# Patient Record
Sex: MALE | Race: WHITE
[De-identification: names, ages, dates, MRNs, and addresses within clinical notes are randomized per-mention and may not be internally consistent; named-entity substitution may affect disease eponyms.]

---

## 2017-10-06 ENCOUNTER — HOSPITAL ENCOUNTER (EMERGENCY)
Dept: HOSPITAL 80 - FED | Age: 72
Discharge: HOME | End: 2017-10-06
Payer: SELF-PAY

## 2017-10-06 VITALS — TEMPERATURE: 97.5 F | OXYGEN SATURATION: 96 %

## 2017-10-06 VITALS — DIASTOLIC BLOOD PRESSURE: 80 MMHG | HEART RATE: 72 BPM | SYSTOLIC BLOOD PRESSURE: 150 MMHG | RESPIRATION RATE: 15 BRPM

## 2017-10-06 DIAGNOSIS — Z76.0: Primary | ICD-10-CM

## 2017-10-06 DIAGNOSIS — N40.0: ICD-10-CM

## 2017-10-06 NOTE — EDPHY
H & P


Time Seen by Provider: 10/06/17 16:57


HPI/ROS: 





CHIEF COMPLAINT:  Medication refill request





HISTORY OF PRESENT ILLNESS:  72-year-old male history of BPH, on daily Flomax, 

recently moved from [place], requesting refill of his Flomax.  Currently 

asymptomatic, Dr. out of Flomax.  No dysuria no hematuria headache no fever no 

chills no perineal pain 











************


PHYSICAL EXAM





(Prior to examination, patient consented to physical exam, hands were washed 

and my usual and customary physical exam procedures followed)


1) GENERAL: Well-developed, well-nourished, alert and oriented.  Appears to be 

in no acute distress.


2) HEAD: Normocephalic


3) HEENT: sclera anicteric   


4) LUNGS: Breathing comfortably.   





Constitutional: 


 Initial Vital Signs











Temperature (C)  36.4 C   10/06/17 15:35


 


Heart Rate  86   10/06/17 15:35


 


Respiratory Rate  20   10/06/17 15:35


 


Blood Pressure  152/88 H  10/06/17 15:35


 


O2 Sat (%)  96   10/06/17 15:35








 











O2 Delivery Mode               Room Air














Allergies/Adverse Reactions: 


 





No Known Allergies Allergy (Unverified 10/06/17 15:35)


 








Home Medications: 














 Medication  Instructions  Recorded


 


Flomax 0.4 MG (*)  10/06/17


 


Tamsulosin HCl [Flomax 0.4 MG (*)] 0.4 mg PO DAILY #30 cap 10/06/17














MDM/Departure





- MDM


ED Course/Re-evaluation: 





Patient given refill of his Flomax for 1 month.  Recommend he establish care 

with primary care, given [facility] and also given him on-call urology 

follow-up information. Care of patient under supervision of  secondary 

supervising physician Dr Ma . 





- Depart


Disposition: Home, Routine, Self-Care


Clinical Impression: 


 Medication refill





BPH (benign prostatic hyperplasia)


Qualifiers:


 Lower urinary tract symptom presence: unspecified whether lower urinary tract 

symptoms present Qualified Code(s): N40.0 - Benign prostatic hyperplasia 

without lower urinary tract symptoms





Condition: Good


Instructions:  Benign Prostatic Hypertrophy (ED)


Prescriptions: 


Tamsulosin HCl [Flomax 0.4 MG (*)] 0.4 mg PO DAILY #30 cap


Referrals: 


Stan Santiago MD [Medical Doctor] - 5-7 days, call for appt. (Dr Santiago is a 

urologist)


[facility],. [Clinic] - 2-3 days, call for appt.

## 2018-10-18 ENCOUNTER — HOSPITAL ENCOUNTER (OUTPATIENT)
Dept: HOSPITAL 80 - FIMAGING | Age: 73
End: 2018-10-18
Attending: UROLOGY
Payer: COMMERCIAL

## 2018-10-18 DIAGNOSIS — N32.3: ICD-10-CM

## 2018-10-18 DIAGNOSIS — K76.89: ICD-10-CM

## 2018-10-18 DIAGNOSIS — K59.00: ICD-10-CM

## 2018-10-18 DIAGNOSIS — N42.89: Primary | ICD-10-CM

## 2018-10-18 PROCEDURE — 74178 CT ABD&PLV WO CNTR FLWD CNTR: CPT

## 2018-11-25 ENCOUNTER — HOSPITAL ENCOUNTER (INPATIENT)
Dept: HOSPITAL 80 - FED | Age: 73
LOS: 4 days | Discharge: NURSING, REHAB OR LONG-TERM CARE | DRG: 689 | End: 2018-11-29
Attending: INTERNAL MEDICINE | Admitting: INTERNAL MEDICINE
Payer: COMMERCIAL

## 2018-11-25 DIAGNOSIS — G93.41: ICD-10-CM

## 2018-11-25 DIAGNOSIS — N39.0: Primary | ICD-10-CM

## 2018-11-25 DIAGNOSIS — G20: ICD-10-CM

## 2018-11-25 DIAGNOSIS — Z16.29: ICD-10-CM

## 2018-11-25 DIAGNOSIS — E87.0: ICD-10-CM

## 2018-11-25 DIAGNOSIS — B96.20: ICD-10-CM

## 2018-11-25 DIAGNOSIS — E86.0: ICD-10-CM

## 2018-11-25 DIAGNOSIS — N40.1: ICD-10-CM

## 2018-11-25 LAB
CK SERPL-CCNC: 703 IU/L (ref 0–224)
PLATELET # BLD: 194 10^3/UL (ref 150–400)

## 2018-11-25 PROCEDURE — G0103 PSA SCREENING: HCPCS

## 2018-11-25 PROCEDURE — G0378 HOSPITAL OBSERVATION PER HR: HCPCS

## 2018-11-25 RX ADMIN — DOCUSATE SODIUM AND SENNOSIDES SCH: 50; 8.6 TABLET ORAL at 22:03

## 2018-11-25 NOTE — GHP
DATE OF ADMISSION:  11/25/2018



CHIEF COMPLAINT:  Altered mental status.



HISTORY:  The patient is a 73-year-old male, who has had decreased energy progressive over the last w
[place], getting acutely worse since last night.  After dinner last night, his son noticed him to be very
 confused.  He suddenly could not walk.  He was diagnosed with a urinary tract infection 2 days ago. 
 It is unclear if he got put on antibiotics.  He does have urinary frequency and according to his son
 gets up 15 times per night to urinate.  There has been no fever.  The patient is unable to provide a
ny additional history due to severe altered mental status on presentation.



PAST MEDICAL HISTORY:  

1.  Parkinson's.

2.  BPH, status post TURP.



MEDICATIONS:  Please see computerized record for full detailed list.



ALLERGIES:  No known drug allergies.



SOCIAL HISTORY:  No smoking.  No alcohol.  He lives with his son and a caretaker in an apartment.



REVIEW OF SYSTEMS:  Complete review of systems obtained.  Review of systems negative for constitution
al, HEENT, GI, pulmonary, cardiovascular, , hematologic, musculoskeletal, endocrine, psych, except 
for positive pertinent negatives as under HPI.  Although please note, this is noted by the patient's 
inability to participate.



FAMILY HISTORY:  Reviewed and noncontributory to presenting complaint.



PHYSICAL EXAMINATION:  GENERAL:  Well-developed, well-nourished male, in no distress.  VITAL SIGNS:  
Temperature is 36.8, pulse 81, blood pressure 118/74, satting 96% on room air.  EYE:  Normal conjunct
ivae.  Pupils equal and reactive to light.  ENT:  Normal ears and nose.  Hearing intact.  Normal teet
h.  Oropharynx is dry.  NECK:  Trachea midline.  No thyromegaly.  CHEST:  Normal respiratory effort. 
 LUNGS:  Clear to auscultation bilaterally.  CARDIOVASCULAR: Regular rate and rhythm.  No murmur.  No
 lower extremity edema.  ABDOMEN:  Soft, nontender.  No hepatosplenomegaly.  SKIN:  Warm, dry, intact
 with no rash.  MUSCULOSKELETAL:  No cyanosis or clubbing.  Strength 5/5 upper and lower extremities.
  Although diffusely weak, is equal bilaterally.  NEURO:  Cranial nerves grossly intact.  Normal sens
ation to light touch as best as I can examine at this time.  PSYCH:  He is confused.  He is altered. 
 He does follow simple commands but rapidly closes his eyes again.  He is not really participating ve
rbally, just moaning.



LABORATORY DATA:  White count 6.4, hematocrit 41, platelets 194.  Sodium of 155, potassium 4.8, chlor
jay 110, bicarb 22, BUN 28, creatinine 0.7, glucose 97.  LFTs are normal.  Chest x-ray is negative.  
Head CT is negative except for scalp nodule.  Urinalysis shows 50 to 182 white blood cells.  His revi
ew of old chart shows recent urine culture growing E coli resistant to Levaquin.  Also recent CT scan
 of the abdomen and pelvis that shows constipation as well as asymmetric prostate enlargement.



ASSESSMENT/PLAN:  

1.  Metabolic encephalopathy due to electrolyte disturbance and urinary tract infection.  We will dar
p him n.p.o. until he is more awake as I do not think he is safe to swallow at this time. 

2.  Hypernatremia.  He got a liter of normal saline in the emergency room.  We will check a stat repe
at sodium now and then follow serially.  We will initiate half-normal saline.

3.  Urinary tract infection.  Recent culture grew E coli resistant to Levaquin.  We will continue IV 
ceftriaxone.  He was given first dose in the emergency room. 

4.  Asymmetric prostate by imaging.  We will check a PSA.

5.  BPH.  Continue Flomax.

6.  Parkinson's.  Consult Physical Therapy, Occupational Therapy.



CODE STATUS:  Full.



ADMISSION STATUS:  We will admit to observation.  We will re-evaluate tomorrow regarding ongoing need
 for hospitalization.



DVT PROPHYLAXIS:  He is high risk.  We will place him on subcu Lovenox.





Job #:  [id number]

## 2018-11-25 NOTE — EDPHY
H & P


Time Seen by Provider: 11/25/18 16:38


HPI/ROS: 





CHIEF COMPLAINT:  Altered mental status





HISTORY OF PRESENT ILLNESS:  The patient is brought in by paramedics with 

altered mental status.  He reportedly has been sick in in bed over the past 

week.  The patient is unable to provide much history.  Paramedics report the 

patient is typically ambulatory and conversant.  They report a history of 

Parkinson's disease and possible prior stroke.  The patient is unable to 

provide any HPI secondary to his altered mental status.





In reviewing the patient's past medical records it appears he had a urinary 

tract infection diagnosed 2 days ago.





The patient's son presents to the emergency department and reports that he did 

see his physician 2 days ago but is uncertain if antibiotics were started.  The 

patient has had symptoms of a mild illness with fatigue, malaise and purulent 

rhinorrhea which started today.  The patient has been able unable to walk.  The 

patient reportedly takes Seroquel at night for possible Parkinson's disease 

according to the patient's son.





REVIEW OF SYSTEMS:


A comprehensive 10 point review of systems is unobtainable secondary to altered 

mental status








Exam Limitations: Clinical condition





- Medical/Surgical History


Hx Asthma: No


Hx Chronic Respiratory Disease: No


Hx Diabetes: No


Hx Cardiac Disease: No


Hx Renal Disease: No


Hx Cirrhosis: No


Hx Alcoholism: No


Hx HIV/AIDS: No


Hx Splenectomy or Spleen Trauma: No


Other PMH: prostate- TURP





- Social History


Smoking Status: Never smoked


Alcohol Use: None





- Physical Exam


Exam: 











General Appearance:  Alert, no distress


Eyes:  Pupils equal and round no pallor or injection


ENT, Mouth:  Dry mucous membranes


Respiratory:  There are no retractions, lungs are clear to auscultation


Cardiovascular:  Regular rate and rhythm


Gastrointestinal:  Generalized abdominal tenderness


Neurological:  Withdrawals to pain all 4 extremities, unable to assess cranial 

nerves, nonverbal


Skin:  Warm and dry, no rashes


Musculoskeletal:  Neck is supple nontender


Extremities:  symmetrical, full range of motion











Constitutional: 


 Initial Vital Signs











Temperature (C)  36.8 C   11/25/18 16:45


 


Heart Rate  88   11/25/18 16:45


 


Respiratory Rate  16   11/25/18 16:45


 


Blood Pressure  151/92 H  11/25/18 16:45


 


O2 Sat (%)  96   11/25/18 16:45








 











O2 Delivery Mode               Room Air


 


O2 (L/minute)                  2














Allergies/Adverse Reactions: 


 





No Known Allergies Allergy (Verified 11/25/18 16:48)


 








Home Medications: 














 Medication  Instructions  Recorded


 


Tamsulosin HCl [Flomax 0.4 MG (*)] 0.4 mg PO DAILY #30 cap 10/06/17


 


Primidone [Mysoline] 25 mg PO HS 11/25/18














Medical Decision Making





- Diagnostics


Imaging Results: 


 Imaging Impressions





Chest X-Ray  11/25/18 16:38


Impression: Perihilar bronchitis with left perihilar diskoid subsegmental 

atelectasis versus scar, with no convincing focal alveolar consolidation.


 


Should there be progression of the patient's symptoms, consider PA and lateral 

upright views in the department.








Head CT  11/25/18 17:07


Impression: 


 


1. There is no acute intracranial abnormality identified on this unenhanced CT 

evaluation.


2. There is a 2.7 x 1.4 x 2.7 cm oval-shaped left occipital scalp solid nodule, 

which could represent a complex sebaceous cyst or a nevus. Clinical correlation 

is suggested.


3. Chronic multicentric paranasal sinusitis, most pronounced at the level of 

the right maxillary sinus.


 


If there is further clinical concern regarding the patient's symptoms, MR 

imaging is suggested, if not otherwise contraindicated.


 


Findings were discussed with Zak Holland MD at 17:55, on 11/25/2018.


 











ED Course/Re-evaluation: 





The patient presents to the ED with altered mental status, marked clinical 

dehydration and a urinary tract infection.





The patient had an IV established.  His initial sodium was noted to be 155.  

The patient received 1 L of normal saline.





I reviewed the patient's past medical records including his urine culture from 

2 days ago which demonstrated E coli.





Blood cultures x2 are obtained.





The patient's initial the lactic acid is normal at 1.5.





The patient was started on 1 g of IV ceftriaxone.





The patient was taken for noncontrast head CT scan which does demonstrate 

sinusitis without evidence of an intracranial hemorrhage.





Discussion:  The patient presents to the ED with increasing weakness in the 

setting of a urinary tract infection and likely upper respiratory infection/

sinusitis.  Patient has been started on IV ceftriaxone in the emergency 

department.  Consultation was made with Dr. Rodríguez from the hospitalist service 

who will admit the patient this evening.























Differential Diagnosis: 





Differential diagnosis considered includes sepsis, severe sepsis, septic shock, 

dehydration, metabolic derangement, urinary tract infection, bacteremia





- Data Points


Laboratory Results: 


 Laboratory Results





 11/25/18 16:37 





 11/25/18 16:37 





 











  11/25/18 11/25/18 11/25/18





  17:00 16:50 16:50


 


WBC      





    


 


RBC      





    


 


Hgb      





    


 


POC Hgb    13.9 gm/dL gm/dL  





    (13.7-17.5)  


 


Hct      





    


 


POC Hct    41 % %  





    (40-51)  


 


MCV      





    


 


MCH      





    


 


MCHC      





    


 


RDW      





    


 


Plt Count      





    


 


MPV      





    


 


Neut % (Auto)      





    


 


Lymph % (Auto)      





    


 


Mono % (Auto)      





    


 


Eos % (Auto)      





    


 


Baso % (Auto)      





    


 


Nucleat RBC Rel Count      





    


 


Absolute Neuts (auto)      





    


 


Absolute Lymphs (auto)      





    


 


Absolute Monos (auto)      





    


 


Absolute Eos (auto)      





    


 


Absolute Basos (auto)      





    


 


Absolute Nucleated RBC      





    


 


Immature Gran %      





    


 


Immature Gran #      





    


 


VBG Lactic Acid      1.5 mmol/L mmol/L





     (0.7-2.1) 


 


POC Sodium    143 mEq/L mEq/L  





    (135-145)  


 


Sodium      





    


 


POC Potassium    4.1 mEq/L mEq/L  





    (3.3-5.0)  


 


Potassium      





    


 


POC Chloride    110 mEq/L mEq/L  





    ()  


 


Chloride      





    


 


Carbon Dioxide      





    


 


Anion Gap      





    


 


POC BUN    27 mg/dL H mg/dL  





    (7-23)  


 


BUN      





    


 


Creatinine      





    


 


POC Creatinine    0.7 mg/dL mg/dL  





    (0.7-1.3)  


 


Estimated GFR      





    


 


Glucose      





    


 


POC Glucose    98 mg/dL mg/dL  





    ()  


 


Calcium      





    


 


Total Bilirubin      





    


 


Conjugated Bilirubin      





    


 


Unconjugated Bilirubin      





    


 


AST      





    


 


ALT      





    


 


Alkaline Phosphatase      





    


 


Creatine Kinase      





    


 


CK-MB (CK-2) Fraction      





    


 


CK-MB (CK-2) %      





    


 


Creatine Kinase Interp      





    


 


Total Protein      





    


 


Albumin      





    


 


Urine Color  YELLOW     





    


 


Urine Appearance  HAZY     





    


 


Urine pH  5.0     





   (5.0-7.5)   


 


Ur Specific Gravity  1.017     





   (1.002-1.030)   


 


Urine Protein  NEGATIVE     





   (NEGATIVE)   


 


Urine Ketones  TRACE  H     





   (NEGATIVE)   


 


Urine Blood  1+  H     





   (NEGATIVE)   


 


Urine Nitrate  POSITIVE  H     





   (NEGATIVE)   


 


Urine Bilirubin  NEGATIVE     





   (NEGATIVE)   


 


Urine Urobilinogen  2.0 EU H EU    





   (0.2-1.0)   


 


Ur Leukocyte Esterase  3+  H     





   (NEGATIVE)   


 


Urine RBC  3-5 /hpf H /hpf    





   (0-3)   


 


Urine WBC   /hpf H /hpf    





   (0-3)   


 


Ur Epithelial Cells  NONE SEEN /lpf /lpf    





   (NONE-1+)   


 


Urine Bacteria  1+ /hpf H /hpf    





   (NONE SEEN)   


 


Urine Glucose  NEGATIVE     





   (NEGATIVE)   














  11/25/18 11/25/18 11/25/18





  16:37 16:37 16:37


 


WBC      6.40 10^3/uL 10^3/uL





     (3.80-9.50) 


 


RBC      4.60 10^6/uL 10^6/uL





     (4.40-6.38) 


 


Hgb      14.3 g/dL g/dL





     (13.7-17.5) 


 


POC Hgb      





    


 


Hct      41.1 % %





     (40.0-51.0) 


 


POC Hct      





    


 


MCV      89.3 fL fL





     (81.5-99.8) 


 


MCH      31.1 pg pg





     (27.9-34.1) 


 


MCHC      34.8 g/dL g/dL





     (32.4-36.7) 


 


RDW      14.9 % %





     (11.5-15.2) 


 


Plt Count      194 10^3/uL 10^3/uL





     (150-400) 


 


MPV      9.3 fL fL





     (8.7-11.7) 


 


Neut % (Auto)      63.7 % %





     (39.3-74.2) 


 


Lymph % (Auto)      20.0 % %





     (15.0-45.0) 


 


Mono % (Auto)      12.5 % %





     (4.5-13.0) 


 


Eos % (Auto)      3.0 % %





     (0.6-7.6) 


 


Baso % (Auto)      0.5 % %





     (0.3-1.7) 


 


Nucleat RBC Rel Count      0.0 % %





     (0.0-0.2) 


 


Absolute Neuts (auto)      4.08 10^3/uL 10^3/uL





     (1.70-6.50) 


 


Absolute Lymphs (auto)      1.28 10^3/uL 10^3/uL





     (1.00-3.00) 


 


Absolute Monos (auto)      0.80 10^3/uL 10^3/uL





     (0.30-0.80) 


 


Absolute Eos (auto)      0.19 10^3/uL 10^3/uL





     (0.03-0.40) 


 


Absolute Basos (auto)      0.03 10^3/uL 10^3/uL





     (0.02-0.10) 


 


Absolute Nucleated RBC      0.00 10^3/uL 10^3/uL





     (0-0.01) 


 


Immature Gran %      0.3 % %





     (0.0-1.1) 


 


Immature Gran #      0.02 10^3/uL 10^3/uL





     (0.00-0.10) 


 


VBG Lactic Acid      





    


 


POC Sodium      





    


 


Sodium    155 mEq/L H mEq/L  





    (135-145)  


 


POC Potassium      





    


 


Potassium    4.8 mEq/L mEq/L  





    (3.3-5.0)  


 


POC Chloride      





    


 


Chloride    110 mEq/L mEq/L  





    ()  


 


Carbon Dioxide    22 mEq/l mEq/l  





    (22-31)  


 


Anion Gap    23 mEq/L H mEq/L  





    (6-14)  


 


POC BUN      





    


 


BUN    28 mg/dL H mg/dL  





    (7-23)  


 


Creatinine    0.7 mg/dL mg/dL  





    (0.7-1.3)  


 


POC Creatinine      





    


 


Estimated GFR    > 60   





    


 


Glucose    97 mg/dL mg/dL  





    ()  


 


POC Glucose      





    


 


Calcium    9.2 mg/dL mg/dL  





    (8.5-10.4)  


 


Total Bilirubin    1.3 mg/dL mg/dL  





    (0.1-1.4)  


 


Conjugated Bilirubin    0.2 mg/dL mg/dL  





    (0.0-0.5)  


 


Unconjugated Bilirubin    1.1 mg/dL mg/dL  





    (0.0-1.1)  


 


AST    45 IU/L IU/L  





    (17-59)  


 


ALT    42 IU/L IU/L  





    (21-72)  


 


Alkaline Phosphatase    71 IU/L IU/L  





    ()  


 


Creatine Kinase  703 IU/L H IU/L    





   (0-224)   


 


CK-MB (CK-2) Fraction  7.16 ng/mL H ng/mL    





   (0.00-4.55)   


 


CK-MB (CK-2) %  1.0 % %    





   (0.0-4.0)   


 


Creatine Kinase Interp  NEGATIVE     





   (NEGATIVE)   


 


Total Protein    7.0 g/dL g/dL  





    (6.3-8.2)  


 


Albumin    3.6 g/dL g/dL  





    (3.5-5.0)  


 


Urine Color      





    


 


Urine Appearance      





    


 


Urine pH      





    


 


Ur Specific Gravity      





    


 


Urine Protein      





    


 


Urine Ketones      





    


 


Urine Blood      





    


 


Urine Nitrate      





    


 


Urine Bilirubin      





    


 


Urine Urobilinogen      





    


 


Ur Leukocyte Esterase      





    


 


Urine RBC      





    


 


Urine WBC      





    


 


Ur Epithelial Cells      





    


 


Urine Bacteria      





    


 


Urine Glucose      





    











Medications Given: 


 








Discontinued Medications





Sodium Chloride (Ns)  1,000 mls @ 0 mls/hr IV EDNOW ONE; Wide Open


   PRN Reason: Protocol


   Stop: 11/25/18 17:08


   Last Admin: 11/25/18 17:15 Dose:  1,000 mls


Ceftriaxone Sodium/Dextrose (Rocephin 1 Gm (Premix))  50 mls @ 100 mls/hr IV 

EDNOW ONE


   PRN Reason: Protocol


   Stop: 11/25/18 17:44


   Last Admin: 11/25/18 17:50 Dose:  50 mls





Point of Care Test Results: 


 Chemistry











  11/25/18





  16:50


 


POC Sodium  143 mEq/L mEq/L





   (135-145) 


 


POC Potassium  4.1 mEq/L mEq/L





   (3.3-5.0) 


 


POC Chloride  110 mEq/L mEq/L





   () 


 


POC BUN  27 mg/dL H mg/dL





   (7-23) 


 


POC Creatinine  0.7 mg/dL mg/dL





   (0.7-1.3) 


 


POC Glucose  98 mg/dL mg/dL





   () 








 ISTAT H&H











  11/25/18





  16:50


 


POC Hgb  13.9 gm/dL gm/dL





   (13.7-17.5) 


 


POC Hct  41 % %





   (40-51) 














Departure





- Departure


Disposition: [facility] Inpatient Acute


Clinical Impression: 


 Dehydration, Urinary tract infection, Sinusitis





Condition: Fair

## 2018-11-26 RX ADMIN — DOCUSATE SODIUM AND SENNOSIDES SCH: 50; 8.6 TABLET ORAL at 23:12

## 2018-11-26 RX ADMIN — ENOXAPARIN SODIUM SCH MG: 100 INJECTION SUBCUTANEOUS at 08:31

## 2018-11-26 RX ADMIN — DOCUSATE SODIUM AND SENNOSIDES SCH TAB: 50; 8.6 TABLET ORAL at 08:30

## 2018-11-26 RX ADMIN — DOCUSATE SODIUM AND SENNOSIDES SCH: 50; 8.6 TABLET ORAL at 08:00

## 2018-11-26 RX ADMIN — PRIMIDONE SCH MG: 50 TABLET ORAL at 20:46

## 2018-11-26 NOTE — HOSPPROG
Hospitalist Progress Note


Assessment/Plan: 





74 yo M w parkinson's admitted w encephalopathy, uti, hypernatremia





hypernatremia: likely 2/2 poor po 


   no diarrhea


   was 137 on 11/25 at noon


   155 last cash at 4:30 p


   corrected to 137 by 1 A


   


   1. stop IVF


   2. stat chem 7


   3. renal to see





uti: continue ceftriaxone


   dc fuentes





parkinson's: continue meds





encephalopathy: suspect it is from uti and mild hypernatremia 2.2 poor po from 

encephalopathy


   follow


   can be slow to clear in parkinson's





proph: lmwh





dispo: change to inpt


Subjective: case d/w dr weaver.  alert but confused


Objective: 


 Vital Signs











Temp Pulse Resp BP Pulse Ox


 


 36.8 C   73   16   119/75   95 


 


 11/26/18 07:18  11/26/18 07:18  11/26/18 07:18  11/26/18 07:18  11/26/18 07:18








 Laboratory Results





 11/26/18 07:20 





 











 11/25/18 11/26/18 11/27/18





 05:59 05:59 05:59


 


Intake Total  1325 


 


Output Total  700 


 


Balance  625 














- Physical Exam


Constitutional: no apparent distress, appears nourished


Eyes: PERRL, anicteric sclera


Ears, Nose, Mouth, Throat: moist mucous membranes, hearing normal


Cardiovascular: regular rate and rhythym, no murmur, rub, or gallop


Respiratory: no respiratory distress, no rales or rhonchi


Gastrointestinal: normoactive bowel sounds, soft, non-tender abdomen


Genitourinary: no bladder fullness, fuentes in urethra


Skin: warm, normal color


Musculoskeletal: full muscle strength, no muscle tenderness


Neurologic: AAOx3





ICD10 Worksheet


Patient Problems: 


 Problems











Problem Status Onset


 


Dehydration Acute  


 


Sinusitis Acute  


 


Urinary tract infection Acute

## 2018-11-26 NOTE — PDMN
Medical Necessity


Medical necessity: MCG M300 UTI:  74 yo w/ UTI dx as outpt, s/sx progressively 

worsened w/ decreased energy, confusion and inability to ambulate.  Metabolic 

encephalopathy due to hypernatremia and UTI. Pt initially OBS but continues 

with confusion, Pt remains NPO as not safe to swallow due to AMS.  IV antibx to 

cont.  PT/OT/SLP consults ordered.  Renal consult pending. BC pending. Pt meets 

IP criteria for UTI w/ persistent AMS and ongoing need for parenteral antibx,.  

Hx Parkinson's, BPH s/p TURP.  Change to IP status 11/26/18 @1234 per MD order

## 2018-11-26 NOTE — ASMTCMCOM
CM Note

 

CM Note                       

Notes:

Patient plan of care reviewed in rounds. 73 year old male admitted via ED for AMS. He has history 

of Parkinsons and presents with UTI and dehydration. He per report also has a caregiver and normal 

is fairly mobile per his son's report. CM to follow for needs.

Plan: TBD

 

Date Signed:  11/26/2018 01:03 PM

Electronically Signed By:Nargis Dillon RN

## 2018-11-26 NOTE — PDCONSULT
Consultant Note: 





Assessment/Plan:





Hypernatremia: Na was 155 on presentation yesterday after being 138 two days 

prior, and then repeat labs have shown it consistently at 137.  On one hand, it 

is possible the 155 is an error as it is the only level out of range.  On the 

other hand, he may have had some significant dehydration in setting of UTI and 

multiple urinations and 155 was real, but this was a very acute change for him 

as it happened in less than two days, so quick correction is less worrisome.


 - Will place on a low rate of LR while NPO.


 - Will recheck tomorrow to ensure stability, no need for further rechecks 

today.








Thank you for the interesting consult.  Nephrology will continue to follow, 

please call if you have any additional questions or concerns.





H & P


Stated Complaint: ems via family - pt getting sicker over 1 wk, non verbal 

since last pm


Time Seen by Provider: 11/25/18 16:38


HPI/ROS: 





HPI:


Mr. Alvarez is a 72 yo M who presented to ER with confusion yesterday.  Per 

son, he had been diagnosed with a UTI two days prior to presentation.  He had 

been noted at home to be getting increasingly lethargic for about a week.  But 

in the day prior to presentation, son noted he seemed much more confused, 

prompting him to bring him to ER.  Pt had sodium of 38 two days prior to 

presentation in ER.  Pt had sodium up to 155 in ER yesterday, was given 1L NS 

and sodium was immediately down to 143 and then has been 137 since then on 

multiple checks.  The large change in sodium prompted consult.





ROS:


unable to obtain 2/2 clinical condition


Source: RN/MD





- Medical/Surgical History


Hx Asthma: No


Hx Chronic Respiratory Disease: No


Hx Diabetes: No


Hx Cardiac Disease: No


Hx Renal Disease: No


Hx Cirrhosis: No


Hx Alcoholism: No


Hx HIV/AIDS: No


Hx Splenectomy or Spleen Trauma: No


Other PMH: prostate- TURP





- Family History


Significant Family History: No pertinent family hx





- Social History


Smoking Status: Never smoked





- Physical Exam


Exam: 





General: awake, no acute distress


Eyes: EOMI, PERRL


OP: Clear, dry MM


Neck: supple, no thyromegaly


Cv: RRR, no edema BLE


Resp: CTA bilat, nonlabored respirations


Abd: Soft, Nt/ND


Neuro: CN II-XII grossly intact, no asterixis


Skin: C/D/I, no rash


: fuentes catheter in place


Constitutional: 


 Initial Vital Signs











Temperature (C)  36.8 C   11/25/18 16:45


 


Heart Rate  88   11/25/18 16:45


 


Respiratory Rate  16   11/25/18 16:45


 


Blood Pressure  151/92 H  11/25/18 16:45


 


O2 Sat (%)  96   11/25/18 16:45








 











O2 Delivery Mode               Room Air


 


O2 (L/minute)                  2














Allergies/Adverse Reactions: 


 





No Known Allergies Allergy (Verified 11/25/18 16:48)


 








Home Medications: 














 Medication  Instructions  Recorded


 


Primidone [Mysoline] 25 mg PO HS 11/25/18


 


QUEtiapine FUMARATE [Seroquel 25 25 mg PO HS 11/25/18





mg (*)]  


 


Herbals/Supplements -Info Only 1 ea PO DAILY 11/26/18


 


Madopar 50mg 1 each PO TID 11/26/18














Lab and Imaging





 11/25/18 16:37





 11/26/18 12:20














WBC  6.40 10^3/uL (3.80-9.50)   11/25/18  16:37    


 


RBC  4.60 10^6/uL (4.40-6.38)   11/25/18  16:37    


 


Hgb  14.3 g/dL (13.7-17.5)   11/25/18  16:37    


 


POC Hgb  13.9 gm/dL (13.7-17.5)   11/25/18  16:50    


 


Hct  41.1 % (40.0-51.0)   11/25/18  16:37    


 


POC Hct  41 % (40-51)   11/25/18  16:50    


 


MCV  89.3 fL (81.5-99.8)   11/25/18  16:37    


 


MCH  31.1 pg (27.9-34.1)   11/25/18  16:37    


 


MCHC  34.8 g/dL (32.4-36.7)   11/25/18  16:37    


 


RDW  14.9 % (11.5-15.2)   11/25/18  16:37    


 


Plt Count  194 10^3/uL (150-400)   11/25/18  16:37    


 


MPV  9.3 fL (8.7-11.7)   11/25/18  16:37    


 


Neut % (Auto)  63.7 % (39.3-74.2)   11/25/18  16:37    


 


Lymph % (Auto)  20.0 % (15.0-45.0)   11/25/18  16:37    


 


Mono % (Auto)  12.5 % (4.5-13.0)   11/25/18  16:37    


 


Eos % (Auto)  3.0 % (0.6-7.6)   11/25/18  16:37    


 


Baso % (Auto)  0.5 % (0.3-1.7)   11/25/18  16:37    


 


Nucleat RBC Rel Count  0.0 % (0.0-0.2)   11/25/18  16:37    


 


Absolute Neuts (auto)  4.08 10^3/uL (1.70-6.50)   11/25/18  16:37    


 


Absolute Lymphs (auto)  1.28 10^3/uL (1.00-3.00)   11/25/18  16:37    


 


Absolute Monos (auto)  0.80 10^3/uL (0.30-0.80)   11/25/18  16:37    


 


Absolute Eos (auto)  0.19 10^3/uL (0.03-0.40)   11/25/18  16:37    


 


Absolute Basos (auto)  0.03 10^3/uL (0.02-0.10)   11/25/18  16:37    


 


Absolute Nucleated RBC  0.00 10^3/uL (0-0.01)   11/25/18  16:37    


 


Immature Gran %  0.3 % (0.0-1.1)   11/25/18  16:37    


 


Immature Gran #  0.02 10^3/uL (0.00-0.10)   11/25/18  16:37    


 


VBG Lactic Acid  1.5 mmol/L (0.7-2.1)   11/25/18  16:50    


 


POC Sodium  143 mEq/L (135-145)   11/25/18  16:50    


 


Sodium  137 mEq/L (135-145)   11/26/18  12:20    


 


POC Potassium  4.1 mEq/L (3.3-5.0)   11/25/18  16:50    


 


Potassium  3.8 mEq/L (3.3-5.0)   11/26/18  12:20    


 


POC Chloride  110 mEq/L ()   11/25/18  16:50    


 


Chloride  111 mEq/L ()  H  11/26/18  12:20    


 


Carbon Dioxide  21 mEq/l (22-31)  L  11/26/18  12:20    


 


Anion Gap  5 mEq/L (6-14)  L  11/26/18  12:20    


 


POC BUN  27 mg/dL (7-23)  H  11/25/18  16:50    


 


BUN  21 mg/dL (7-23)   11/26/18  12:20    


 


Creatinine  0.6 mg/dL (0.7-1.3)  L  11/26/18  12:20    


 


POC Creatinine  0.7 mg/dL (0.7-1.3)   11/25/18  16:50    


 


Estimated GFR  > 60   11/26/18  12:20    


 


Glucose  102 mg/dL ()  H  11/26/18  12:20    


 


POC Glucose  98 mg/dL ()   11/25/18  16:50    


 


Calcium  8.5 mg/dL (8.5-10.4)   11/26/18  12:20    


 


Total Bilirubin  1.3 mg/dL (0.1-1.4)   11/25/18  16:37    


 


Conjugated Bilirubin  0.2 mg/dL (0.0-0.5)   11/25/18  16:37    


 


Unconjugated Bilirubin  1.1 mg/dL (0.0-1.1)   11/25/18  16:37    


 


AST  45 IU/L (17-59)   11/25/18  16:37    


 


ALT  42 IU/L (21-72)   11/25/18  16:37    


 


Alkaline Phosphatase  71 IU/L ()   11/25/18  16:37    


 


Creatine Kinase  703 IU/L (0-224)  H  11/25/18  16:37    


 


CK-MB (CK-2) Fraction  7.16 ng/mL (0.00-4.55)  H  11/25/18  16:37    


 


CK-MB (CK-2) %  1.0 % (0.0-4.0)   11/25/18  16:37    


 


Creatine Kinase Interp  NEGATIVE  (NEGATIVE)   11/25/18  16:37    


 


Total Protein  7.0 g/dL (6.3-8.2)   11/25/18  16:37    


 


Albumin  3.6 g/dL (3.5-5.0)   11/25/18  16:37    


 


PSA Screen  1.800 ng/mL (0.000-4.000)   11/26/18  01:15    


 


TSH  1.730 uIU/mL (0.465-4.680)   11/26/18  01:15    


 


Urine Color  YELLOW   11/25/18  17:00    


 


Urine Appearance  HAZY   11/25/18  17:00    


 


Urine pH  5.0  (5.0-7.5)   11/25/18  17:00    


 


Ur Specific Gravity  1.017  (1.002-1.030)   11/25/18  17:00    


 


Urine Protein  NEGATIVE  (NEGATIVE)   11/25/18  17:00    


 


Urine Ketones  TRACE  (NEGATIVE)  H  11/25/18  17:00    


 


Urine Blood  1+  (NEGATIVE)  H  11/25/18  17:00    


 


Urine Nitrate  POSITIVE  (NEGATIVE)  H  11/25/18  17:00    


 


Urine Bilirubin  NEGATIVE  (NEGATIVE)   11/25/18  17:00    


 


Urine Urobilinogen  2.0 EU (0.2-1.0)  H  11/25/18  17:00    


 


Ur Leukocyte Esterase  3+  (NEGATIVE)  H  11/25/18  17:00    


 


Urine RBC  3-5 /hpf (0-3)  H  11/25/18  17:00    


 


Urine WBC   /hpf (0-3)  H  11/25/18  17:00    


 


Ur Epithelial Cells  NONE SEEN /lpf (NONE-1+)   11/25/18  17:00    


 


Urine Bacteria  1+ /hpf (NONE SEEN)  H  11/25/18  17:00    


 


Urine Glucose  NEGATIVE  (NEGATIVE)   11/25/18  17:00

## 2018-11-27 RX ADMIN — PRIMIDONE SCH MG: 50 TABLET ORAL at 20:31

## 2018-11-27 RX ADMIN — ENOXAPARIN SODIUM SCH MG: 100 INJECTION SUBCUTANEOUS at 08:56

## 2018-11-27 RX ADMIN — DOCUSATE SODIUM AND SENNOSIDES SCH: 50; 8.6 TABLET ORAL at 20:56

## 2018-11-27 RX ADMIN — DOCUSATE SODIUM AND SENNOSIDES SCH: 50; 8.6 TABLET ORAL at 14:30

## 2018-11-27 NOTE — HOSPPROG
Hospitalist Progress Note


Assessment/Plan: 





*  Hypernatremia


   -rapid correction - Na 155 may have been lab error as no other Na levels 

even close to that range


   -labs done 2 days prior to admission show normal sodium - therefore if real, 

definitely acute


   -given acute nature of hypernatremia - rapid correction less worrisome


*  Metabolic encephalopathy - due to UTI


   -slow improvement mental status


*  UTI - question possible prostatitis give asymmetry of prostate on imaging, 

recurrent Ecoli


   -levaquin resistant


   -continue IV ceftriaxone


*  BPH s/p TURP


   -PSA normal


   -DC fuentes with voiding trials


*  Parkinson's


   -needs home med











 


Subjective: no new complaints, yesterday very bad day confusion wise, today 

much better


Objective: 


 Vital Signs











Temp Pulse Resp BP Pulse Ox


 


 36.4 C   65   16   83/49 L  95 


 


 11/27/18 17:36  11/27/18 17:36  11/27/18 17:36  11/27/18 17:36  11/27/18 17:36








 Laboratory Results





 11/27/18 04:40 





 











 11/26/18 11/27/18 11/28/18





 05:59 05:59 05:59


 


Intake Total  900 


 


Output Total  1150 


 


Balance  -250 














- Physical Exam


Constitutional: no apparent distress, appears nourished, not in pain


Cardiovascular: regular rate and rhythym, no murmur, rub, or gallop


Respiratory: no respiratory distress, no rales or rhonchi, clear to auscultation


Gastrointestinal: normoactive bowel sounds, soft, non-tender abdomen, no 

palpable masses


Skin: no rashes or abrasions, no fluctuance, no induration


Neurologic: AAOx3, sensation intact bilaterally


Psychiatric: interacting appropriately, not anxious, not encephalopathic, 

thought process linear





ICD10 Worksheet


Patient Problems: 


 Problems











Problem Status Onset


 


Dehydration Acute  


 


Urinary tract infection Acute  


 


Sinusitis Acute

## 2018-11-28 RX ADMIN — ENOXAPARIN SODIUM SCH MG: 100 INJECTION SUBCUTANEOUS at 08:54

## 2018-11-28 RX ADMIN — DOCUSATE SODIUM AND SENNOSIDES SCH TAB: 50; 8.6 TABLET ORAL at 21:55

## 2018-11-28 RX ADMIN — PRIMIDONE SCH MG: 50 TABLET ORAL at 21:54

## 2018-11-28 RX ADMIN — DOCUSATE SODIUM AND SENNOSIDES SCH: 50; 8.6 TABLET ORAL at 08:54

## 2018-11-28 NOTE — ASMTCMCOM
CM Note

 

CM Note                       

Notes:

Discussed plan of care with MD. Patient may need SNF instead of HHC. Referral to [facility]. CM to 

follow.



Plan: TBD

 

Date Signed:  11/28/2018 09:17 AM

Electronically Signed By:Nargis Dillon RN

## 2018-11-28 NOTE — ASMTCMCOM
CM Note

 

CM Note                       

Notes:

Patient discussed in rounds. CM met with pt and pts elenitaewJorje to provide education on therapy 

recommendations. Pt and family agreeable to SNF at [facility]. CM informed [facility] of D/C plans 

for tomorrow. CM to follow. 



Plan: [facility]

 

Date Signed:  11/28/2018 12:44 PM

Electronically Signed By:LAURA Watts

## 2018-11-28 NOTE — HOSPPROG
Hospitalist Progress Note


Assessment/Plan: 





*  Hypernatremia


   -rapid correction - Na 155 may have been lab error as no other Na levels 

even close to that range


   -labs done 2 days prior to admission show normal sodium - therefore if real, 

definitely acute


   -given acute nature of hypernatremia - rapid correction less worrisome


*  Metabolic encephalopathy - due to UTI


   -slow improvement mental status


*  UTI - question possible prostatitis give asymmetry of prostate on imaging, 

recurrent Ecoli


   -levaquin resistant E.coli (urine culture done outpatient prior to admission)


   -change to PO Bactrim


      -will have better prostrate penetration, but not sure he needs full 6 

weeks for prostatis


*  BPH s/p TURP


   -PSA normal


*  Parkinson's


   -needs home med











 


Subjective: No complaints.   Still very weak, unsteady, not safe for home


Objective: 


 Vital Signs











Temp Pulse Resp BP Pulse Ox


 


 36.6 C   73   16   100/54 L  96 


 


 11/28/18 11:29  11/28/18 11:29  11/28/18 11:29  11/28/18 11:29  11/28/18 11:29








 Laboratory Results





 11/28/18 04:52 





 











 11/27/18 11/28/18 11/29/18





 05:59 05:59 05:59


 


Intake Total 900 300 250


 


Output Total 1150 575 500


 


Balance -250 -275 -250














- Physical Exam


Constitutional: no apparent distress, appears nourished, not in pain


Cardiovascular: regular rate and rhythym, no murmur, rub, or gallop


Respiratory: no respiratory distress, no rales or rhonchi, clear to auscultation


Gastrointestinal: normoactive bowel sounds, soft, non-tender abdomen, no 

palpable masses


Skin: no rashes or abrasions, no fluctuance, no induration


Neurologic: AAOx3, sensation intact bilaterally


Psychiatric: interacting appropriately, not anxious, not encephalopathic, 

thought process linear





ICD10 Worksheet


Patient Problems: 


 Problems











Problem Status Onset


 


Dehydration Acute  


 


Sinusitis Acute  


 


Urinary tract infection Acute

## 2018-11-29 VITALS — DIASTOLIC BLOOD PRESSURE: 52 MMHG | SYSTOLIC BLOOD PRESSURE: 87 MMHG

## 2018-11-29 RX ADMIN — ENOXAPARIN SODIUM SCH MG: 100 INJECTION SUBCUTANEOUS at 10:15

## 2018-11-29 RX ADMIN — DOCUSATE SODIUM AND SENNOSIDES SCH TAB: 50; 8.6 TABLET ORAL at 10:16

## 2018-11-29 NOTE — ASMTDCNOTE
Case Management Discharge

 

Discharge Order Complete?     Answers:  Yes                                   

Patient to Obtain             Answers:  Other                         Notes:  [facility]

Medications                                                                   

Transportation Arranged       Answers:  Other                         Notes:  [employer]

Transport will Pick (Date     11/29/2018 04:00 PM

& Time)                       

Faxed Final Orders            Answers:  Yes                                   

Agency/Facility Transfer      Answers:  Yes                                   

Report Printed & Faxed to                                                     

Receiving Agency                                                              

Family Notified               Answers:  Yes                           Notes:  by pt's RN

Discharge Comments            

Notes:

Pt to discharge today to [facility]. Updated referrals sent. RN given number for RN to RN report at 


[facility]. Nephew requested to ride with [facility] to [facility] and [facility] arranged for 

this to be ok. Facesheet given to [facility].  No further CM needs noted at this time. 

 

Date Signed:  11/29/2018 03:50 PM

Electronically Signed By:Alfreda Tyler

## 2018-11-29 NOTE — PDIAF
- Diagnosis


Code Status: Full Code





- Medication Management


Long Term Antibiotics: Bactrim DS 1 tab BID


Long Term Antibiotic Stop Date: 12/09/18


Discharge Medications: electronically signed and located in the Home Medication 

List.





- Orders


Services needed: Certified Nursing Aide, Physical Therapy, Occupational Therapy


Diet Texture: Dysphagia 1 - Pureed, Thin Liquids, Meds Whole w/Liquids





- Follow Up Care


Current Providers and Referrals: 


Patient,NotPresent [Unknown] - As per Instructions

## 2018-11-29 NOTE — PDDCSUM
Discharge Summary


Discharge Summary: 


Date of Admission: 11/26/2018


 Date of Discharge: 11/29/2018





Consults: Nephrology 


Procedures: Head CT


Followup: PCP





Hospital Course Problem List: 


*  Hypernatremia


   -rapid correction - Na 155 on admisison may have been lab error as no other 

Na levels even close to that range


   -labs done 2 days prior to admission show normal sodium - therefore if real, 

definitely acute


   -given acute nature of hypernatremia - rapid correction less worrisome, 

improved to 130 without intervention, continue to monitor 


*  Metabolic encephalopathy - due to UTI, Head CT negative on admission 


   -back to baseline 


*  UTI - question possible prostatitis give asymmetry of prostate on imaging, 

recurrent Ecoli


   -levaquin resistant E.coli (urine culture done outpatient prior to admission)


   -change to PO Bactrim for total 2 week course 


*  BPH s/p TURP


   -PSA normal


*  Parkinson's


   -Continue home medications





Time spent on discharge was >35 minutes with >50% of time spent on patient 

education and counseling

## 2018-11-29 NOTE — ASDISCHSUM
----------------------------------------------

Discharge Information

----------------------------------------------

Plan Status:SNF                                      Medically Cleared to Leave:11/28/2018

Discharge Date:11/28/2018                            CM D/C Disposition:Skilled Nursing Facility

ADT D/C Disposition:                                 Projected Discharge Date:11/28/2018 11:00 AM

Transportation at D/C:[employer]                 Discharge Delay Reason:

Follow-Up Date:11/28/2018 11:00 AM                   Discharge Slot:

Final Diagnosis:UTI and prostatitis

----------------------------------------------

Placement Information

----------------------------------------------

Referral Type:*Home Health Care Services             Referral ID:[id number]

Provider Name:

Address 1:                                           Phone Number:

Address 2:                                           Fax Number:

City:                                                [facility] Factors:

State:

 

Referral Type:*Nursing Home/SNF                      Referral ID:[id number]

Provider Name:[facility]

Address 1:[address] [place] [address]                             Phone [phone]

Address 2:                                           Fax [phone]

City:[place]                                         Selection Factors:

State:[place]

 

----------------------------------------------

Patient Contact Information

----------------------------------------------

Contact Name:SÁNCHEZ                         Relationship:Son

Address:                                             Home [phone]

                                                     Work Phone:

City:[facility]                                 Alternate Phone:

State/Zip Code:                                      Email:

----------------------------------------------

Financial Information

----------------------------------------------

Financial Class:Medicare

Primary Plan Desc:MEDICARE INPATIENT                 Primary Plan Number:[id number]

[facility]     Secondary Plan Number:[id number]

 

 

----------------------------------------------

Assessment Information

----------------------------------------------

----------------------------------------------

LACE

----------------------------------------------

LACE

 

Length of stay for            Answers:  3 days                                

current admission                                                             

Acuity / Level of             Answers:  Yes                                   

Care: Did the patient                                                         

have an inpatient                                                             

admission?                                                                    

Comorbidities - select        Answers:  Other                         Notes:  Parkinson's disease

all that apply                                                                

# of Emergency department     Answers:  1-2                                   

visits in the last 6                                                          

months                                                                        

Score: 8

 

Date Signed:  11/29/2018 03:47 PM

Electronically Signed By:Alfreda Tyler

 

 

----------------------------------------------

[facility] CM Progress Note

----------------------------------------------

CM Note

 

CM Note                       

Notes:

Patient plan of care reviewed in rounds. 73 year old male admitted via ED for AMS. He has history 

of Parkinsons and presents with UTI and dehydration. He per report also has a caregiver and normal 

is fairly mobile per his son's report. CM to follow for needs.

Plan: TBD

 

Date Signed:  11/26/2018 01:03 PM

Electronically Signed By:Nargis Dillon RN

 

 

----------------------------------------------

[facility] Progress Note

----------------------------------------------

CM Note

 

CM Note                       

Notes:

Discussed plan of care with MD. Patient may need SNF instead of C. Referral to [facility]. CM to 

follow.



Plan: TBD

 

Date Signed:  11/28/2018 09:17 AM

Electronically Signed By:Nargis Dillon RN

 

 

----------------------------------------------

[facility] AN Progress Note

----------------------------------------------

CM Note

 

CM Note                       

Notes:

Patient discussed in rounds. CM met with pt and pts Jorje hernandez to provide education on therapy 

recommendations. Pt and family agreeable to SNF at [facility]. CM informed [facility] of D/C plans 

for tomorrow. CM to follow. 



Plan: [facility]

 

Date Signed:  11/28/2018 12:44 PM

Electronically Signed By:LAURA Watts

 

 

----------------------------------------------

Case Management Discharge Plan Note

----------------------------------------------

Case Management Discharge

 

Discharge Order Complete?     Answers:  Yes                                   

Patient to Obtain             Answers:  Other                         Notes:  [facility]

Medications                                                                   

Transportation Arranged       Answers:  Other                         Notes:  [employer]

Transport will Pick (Date     11/29/2018 04:00 PM

& Time)                       

Faxed Final Orders            Answers:  Yes                                   

Agency/Facility Transfer      Answers:  Yes                                   

Report Printed & Faxed to                                                     

Receiving Agency                                                              

Family Notified               Answers:  Yes                           Notes:  by pt's RN

Discharge Comments            

Notes:

Pt to discharge today to [facility]. Updated referrals sent. RN given number for RN to RN report at 


[facility]. Nephew requested to ride with [employer] to facility and [facility] arranged for 

this to be ok. Facesheet given to [facility].  No further CM needs noted at this time. 

 

Date Signed:  11/29/2018 03:50 PM

Electronically Signed By:Alfreda Tyler

 

 

----------------------------------------------

Intervention Information

----------------------------------------------

Intervention Type:*POWERS-Signed                       Date of Service:11/26/2018 10:58 AM

Patient Type:Observation                             Staff Member:Magi Leija

Hours:                                               Discipline:

Severity:                                            Comment:

Intervention Type:*IM-Signed                         Date of Service:11/29/2018 02:22 PM

Patient Type:Inpatient                               Staff Member:Magi Leija

Hours:                                               Discipline:

Severity:                                            Comment:

## 2018-12-25 ENCOUNTER — HOSPITAL ENCOUNTER (INPATIENT)
Dept: HOSPITAL 80 - FED | Age: 73
LOS: 4 days | Discharge: NURSING, REHAB OR LONG-TERM CARE | DRG: 71 | End: 2018-12-29
Attending: INTERNAL MEDICINE | Admitting: INTERNAL MEDICINE
Payer: COMMERCIAL

## 2018-12-25 DIAGNOSIS — F02.80: ICD-10-CM

## 2018-12-25 DIAGNOSIS — G31.83: ICD-10-CM

## 2018-12-25 DIAGNOSIS — K59.00: ICD-10-CM

## 2018-12-25 DIAGNOSIS — G93.41: Primary | ICD-10-CM

## 2018-12-25 DIAGNOSIS — G25.0: ICD-10-CM

## 2018-12-25 DIAGNOSIS — E72.20: ICD-10-CM

## 2018-12-25 DIAGNOSIS — Z87.440: ICD-10-CM

## 2018-12-25 DIAGNOSIS — R62.7: ICD-10-CM

## 2018-12-25 DIAGNOSIS — R26.89: ICD-10-CM

## 2018-12-25 LAB — PLATELET # BLD: 191 10^3/UL (ref 150–400)

## 2018-12-25 PROCEDURE — G0378 HOSPITAL OBSERVATION PER HR: HCPCS

## 2018-12-25 PROCEDURE — A9585 GADOBUTROL INJECTION: HCPCS

## 2018-12-25 PROCEDURE — G0480 DRUG TEST DEF 1-7 CLASSES: HCPCS

## 2018-12-25 RX ADMIN — PRIMIDONE SCH MG: 50 TABLET ORAL at 20:23

## 2018-12-25 NOTE — ASMTCMCOM
CM Note

 

CM Note                       

Notes:

See ED report for details regarding presentation to ED via EMS.  Patient is [place] and speaks 

minimal English.



Patient's son Jorje [phone] contacted per this CM upon patient's arrival to ED.  Jorje states 

that patient was discharged home from [facility] ([facility]) within the past week and has been having 

increasing difficulty with AMS and ambulating at home.  per Jorje, patient was stable on his feet 

upon returning home and is now "unsteady" and more impulsive with his activity.  Patient has been 

"road tested" in the ED and unable to ambulate safely without assistance due to unstable gait.



This CM has contacted Shanifrah at [facility] *** [phone]*** CM contact ONLY.  Carlotta 

confirms that this is a change in status and patient will likely meet criteria (within 30 day 

window) for readmission to SNF.  Carlotta also confirms that  had made the recommendation to 

family to seek an AL/Memory Care placement for patient due to patient's increasing 

dementia.  Carlotta is unable to have someone out for assessment until morning.



I have discussed above with Jorje.  He agrees with the recommendation for a higher level of care and 


has begun looking into options at [facility] and [facility].  He will continue this process.  



Patient will be admitted for observation with "potential" and Carlotta will follow up with return 

admission to  tomorrow.  This CM has initiated the referral via [employer].  



CM to follow



 

 

Date Signed:  12/25/2018 05:24 PM

Electronically Signed By:Nida Duran RN

## 2018-12-25 NOTE — PDGENHP
History and Physical





- Chief Complaint


AMS 





- History of Present Illness


Clovis Alvarez is a 72 yo M with a PMHx of Dementia, recent UTI, BPH who 

presents to [facility] for AMS.  Patient was recently hospitalized for UTI and 

dehydration for which he completed 2 weeks of antibiotics.  He currently denies 

any dysuria, urinary frequency, bladder tenderness.  He denies any chest pain, f

/c, n/v, d/c, SOB, LH, dizziness.  Per ED report, patient has had difficulty 

walking over past several days with normal gait previously.  








History Information





- Allergies/Home Medication List


Allergies/Adverse Reactions: 








No Known Allergies Allergy (Verified 12/25/18 13:30)


 





Home Medications: 








Aspirin EC [Aspirin EC 81 mg (*)] 81 mg PO DAILY 12/25/18 [Last Taken 12/24/18]


Primidone [Mysoline 50mg (RX)] 25 mg PO HS 12/25/18 [Last Taken 12/24/18]





I have personally reviewed and updated: family history, medical history, social 

history, surgical history





- Social History


Smoking Status: Never smoked





Review of Systems


Review of Systems: 








Physical Exam


Physical Exam: 

















Temp Pulse Resp BP Pulse Ox


 


 37.1 C   70   17   125/72 H  96 


 


 12/25/18 18:29  12/25/18 18:29  12/25/18 18:29  12/25/18 18:29  12/25/18 18:29














Lab Data & Imaging Review





 12/25/18 13:45





 12/25/18 13:45














WBC  3.73 10^3/uL (3.80-9.50)  L  12/25/18  13:45    


 


RBC  4.27 10^6/uL (4.40-6.38)  L  12/25/18  13:45    


 


Hgb  13.7 g/dL (13.7-17.5)   12/25/18  13:45    


 


Hct  40.4 % (40.0-51.0)   12/25/18  13:45    


 


MCV  94.6 fL (81.5-99.8)   12/25/18  13:45    


 


MCH  32.1 pg (27.9-34.1)   12/25/18  13:45    


 


MCHC  33.9 g/dL (32.4-36.7)   12/25/18  13:45    


 


RDW  14.7 % (11.5-15.2)   12/25/18  13:45    


 


Plt Count  191 10^3/uL (150-400)   12/25/18  13:45    


 


MPV  9.5 fL (8.7-11.7)   12/25/18  13:45    


 


Neut % (Auto)  38.6 % (39.3-74.2)  L  12/25/18  13:45    


 


Lymph % (Auto)  41.0 % (15.0-45.0)   12/25/18  13:45    


 


Mono % (Auto)  12.3 % (4.5-13.0)   12/25/18  13:45    


 


Eos % (Auto)  6.7 % (0.6-7.6)   12/25/18  13:45    


 


Baso % (Auto)  1.1 % (0.3-1.7)   12/25/18  13:45    


 


Nucleat RBC Rel Count  0.0 % (0.0-0.2)   12/25/18  13:45    


 


Absolute Neuts (auto)  1.44 10^3/uL (1.70-6.50)  L  12/25/18  13:45    


 


Absolute Lymphs (auto)  1.53 10^3/uL (1.00-3.00)   12/25/18  13:45    


 


Absolute Monos (auto)  0.46 10^3/uL (0.30-0.80)   12/25/18  13:45    


 


Absolute Eos (auto)  0.25 10^3/uL (0.03-0.40)   12/25/18  13:45    


 


Absolute Basos (auto)  0.04 10^3/uL (0.02-0.10)   12/25/18  13:45    


 


Absolute Nucleated RBC  0.00 10^3/uL (0-0.01)   12/25/18  13:45    


 


Immature Gran %  0.3 % (0.0-1.1)   12/25/18  13:45    


 


Immature Gran #  0.01 10^3/uL (0.00-0.10)   12/25/18  13:45    


 


Sodium  137 mEq/L (135-145)   12/25/18  13:45    


 


Potassium  3.9 mEq/L (3.5-5.2)   12/25/18  13:45    


 


Chloride  109 mEq/L ()   12/25/18  13:45    


 


Carbon Dioxide  24 mEq/l (22-31)   12/25/18  13:45    


 


Anion Gap  4 mEq/L (6-14)  L  12/25/18  13:45    


 


BUN  25 mg/dL (7-23)  H  12/25/18  13:45    


 


Creatinine  0.8 mg/dL (0.7-1.3)   12/25/18  13:45    


 


Estimated GFR  > 60   12/25/18  13:45    


 


Glucose  96 mg/dL ()   12/25/18  13:45    


 


Calcium  9.0 mg/dL (8.5-10.4)   12/25/18  13:45    


 


Urine Color  YELLOW   12/25/18  14:53    


 


Urine Appearance  CLEAR   12/25/18  14:53    


 


Urine pH  6.0  (5.0-7.5)   12/25/18  14:53    


 


Ur Specific Gravity  1.013  (1.002-1.030)   12/25/18  14:53    


 


Urine Protein  NEGATIVE  (NEGATIVE)   12/25/18  14:53    


 


Urine Ketones  NEGATIVE  (NEGATIVE)   12/25/18  14:53    


 


Urine Blood  NEGATIVE  (NEGATIVE)   12/25/18  14:53    


 


Urine Nitrate  NEGATIVE  (NEGATIVE)   12/25/18  14:53    


 


Urine Bilirubin  NEGATIVE  (NEGATIVE)   12/25/18  14:53    


 


Urine Urobilinogen  NEGATIVE EU (0.2-1.0)   12/25/18  14:53    


 


Ur Leukocyte Esterase  NEGATIVE  (NEGATIVE)   12/25/18  14:53    


 


Urine Glucose  NEGATIVE  (NEGATIVE)   12/25/18  14:53    











Assessment & Plan


Assessment: 


Altered Mental Status


- Confused per family, hx of dementia


- AAOx2 on exam, interacting appropriately


- UA negative, recent UTI with AMS, denies urinary symptoms


- May be related to medications, home medications include Primidone, Seroquel, 

Madopar


- Head CT on admission was negative for acute abnormality


- Continue to monitor 





Failure to Thrive/Gait instability (Acute)


- 4 days duration per family


- PT/OT ordered


- CM working on placement 





Parkinson's Disease 


- Continue home medications pending med pec





FEN: Regular, IVF 





DVT PPx: Lovenox





Code: FULL





Dispo: Admit to Observation

## 2018-12-25 NOTE — EDPHY
H & P


Time Seen by Provider: 12/25/18 13:26


HPI/ROS: 





CHIEF COMPLAINT:  Altered mental status





HISTORY OF PRESENT ILLNESS:  The patient presents to the ED with altered mental 

status.  The patient was hospitalized recently with a urinary tract infection 

and dehydration.  He completed antibiotics for 2 weeks.  Patient was noted to 

have an E coli infection at that point time which was resistant to Levaquin.  

It appears the patient did have a follow-up urinalysis performed following 

treatment which demonstrated clearance of the E coli infection.  The patient 

himself does have a history of dementia.  He speaks only Swazi.  History was 

obtained from the electronic .  The patient has no acute complaints.

  He denies history of fall or trauma.





After some time the patient's family presented to the emergency department 

report that he has primarily had difficulty walking over the past several days.

  He had been ambulatory with a normal gait 4 days ago.





REVIEW OF SYSTEMS:


A comprehensive 10 point review of systems is otherwise negative aside from 

elements mentioned in the history of present illness.








Source: Patient


Exam Limitations: No limitations





- Medical/Surgical History


Hx Asthma: No


Hx Chronic Respiratory Disease: No


Hx Diabetes: No


Hx Cardiac Disease: No


Hx Renal Disease: No


Hx Cirrhosis: No


Hx Alcoholism: No


Hx HIV/AIDS: No


Hx Splenectomy or Spleen Trauma: No


Other PMH: prostate- TURP/Parkinsons





- Social History


Smoking Status: Never smoked





- Physical Exam


Exam: 





General Appearance:  Elderly male


Eyes:  Pupils equal and round no pallor or injection


ENT, Mouth:  Dry mucous membranes


Respiratory:  There are no retractions, lungs are clear to auscultation


Cardiovascular:  Regular rate and rhythm


Gastrointestinal:  Abdomen is soft and nontender, no masses, bowel sounds normal


Neurological:  A&O, normal motor function, normal sensory exam, normal cranial 

nerves, patient is ambulatory however is noted to have an ataxic gait


Skin:  Warm and dry, no rashes


Musculoskeletal:  Neck is supple nontender


Extremities:  symmetrical, full range of motion


Psychiatric:  Patient is oriented X 1 (baseline), there is no agitation


Constitutional: 


 Initial Vital Signs











Temperature (C)  36.9 C   12/25/18 13:30


 


Heart Rate  76   12/25/18 13:30


 


Respiratory Rate  16   12/25/18 13:30


 


Blood Pressure  127/79 H  12/25/18 13:30


 


O2 Sat (%)  97   12/25/18 13:30








 











O2 Delivery Mode               Room Air














Allergies/Adverse Reactions: 


 





No Known Allergies Allergy (Verified 12/25/18 13:30)


 








Home Medications: 














 Medication  Instructions  Recorded


 


Primidone [Mysoline] 25 mg PO HS 11/25/18


 


QUEtiapine FUMARATE [Seroquel 25 25 mg PO HS 11/25/18





mg (*)]  


 


Herbals/Supplements -Info Only 1 ea PO DAILY 11/26/18


 


Madopar 50mg 1 each PO TID 11/26/18


 


Acetaminophen [Tylenol 325mg (*)] 650 mg PO Q4 PRN  tab 11/29/18


 


Sulfamethox/Tmp 800/160 mg 1 ea PO BID #20 tab 11/29/18





[Bactrim DS]  














Medical Decision Making


ED Course/Re-evaluation: 





I reviewed the patient's past medical records including the results of his 

hospitalization month ago.  I reviewed his outpatient urine culture from 2 

weeks ago.





The patient is nontoxic and well-appearing.  He appears at his neurologic 

baseline with evidence of likely dementia.  He has no external signs of trauma.

  He is afebrile.





Given the patient's gait imbalance a CT scan of the brain was obtained which 

demonstrates no acute abnormalities.





Patient's laboratory studies are within normal limits.





The patient's urinalysis demonstrates no evidence of an infection.





At this point time I see no evidence of a explanation for acute weakness and 

difficulty walking aside from mild dehydration and possible progression of his 

underlying dementia.





The patient was evaluated by case management.  Secondary to his acute gait 

abnormality he will be admitted to the hospital for attempted placement in a 

skilled nursing facility.





Consultation was made with Dr. Chase at 5:30 p.m.











Differential Diagnosis: 





Differential diagnosis considered includes dementia, dehydration, metabolic 

abnormality, urinary tract infection





- Data Points


Laboratory Results: 


 Laboratory Results





 12/25/18 13:45 





 12/25/18 13:45 





 











  12/25/18 12/25/18 12/25/18





  14:53 13:45 13:45


 


WBC      3.73 10^3/uL L 10^3/uL





     (3.80-9.50) 


 


RBC      4.27 10^6/uL L 10^6/uL





     (4.40-6.38) 


 


Hgb      13.7 g/dL g/dL





     (13.7-17.5) 


 


Hct      40.4 % %





     (40.0-51.0) 


 


MCV      94.6 fL fL





     (81.5-99.8) 


 


MCH      32.1 pg pg





     (27.9-34.1) 


 


MCHC      33.9 g/dL g/dL





     (32.4-36.7) 


 


RDW      14.7 % %





     (11.5-15.2) 


 


Plt Count      191 10^3/uL 10^3/uL





     (150-400) 


 


MPV      9.5 fL fL





     (8.7-11.7) 


 


Neut % (Auto)      38.6 % L %





     (39.3-74.2) 


 


Lymph % (Auto)      41.0 % %





     (15.0-45.0) 


 


Mono % (Auto)      12.3 % %





     (4.5-13.0) 


 


Eos % (Auto)      6.7 % %





     (0.6-7.6) 


 


Baso % (Auto)      1.1 % %





     (0.3-1.7) 


 


Nucleat RBC Rel Count      0.0 % %





     (0.0-0.2) 


 


Absolute Neuts (auto)      1.44 10^3/uL L 10^3/uL





     (1.70-6.50) 


 


Absolute Lymphs (auto)      1.53 10^3/uL 10^3/uL





     (1.00-3.00) 


 


Absolute Monos (auto)      0.46 10^3/uL 10^3/uL





     (0.30-0.80) 


 


Absolute Eos (auto)      0.25 10^3/uL 10^3/uL





     (0.03-0.40) 


 


Absolute Basos (auto)      0.04 10^3/uL 10^3/uL





     (0.02-0.10) 


 


Absolute Nucleated RBC      0.00 10^3/uL 10^3/uL





     (0-0.01) 


 


Immature Gran %      0.3 % %





     (0.0-1.1) 


 


Immature Gran #      0.01 10^3/uL 10^3/uL





     (0.00-0.10) 


 


Sodium    137 mEq/L mEq/L  





    (135-145)  


 


Potassium    3.9 mEq/L mEq/L  





    (3.5-5.2)  


 


Chloride    109 mEq/L mEq/L  





    ()  


 


Carbon Dioxide    24 mEq/l mEq/l  





    (22-31)  


 


Anion Gap    4 mEq/L L mEq/L  





    (6-14)  


 


BUN    25 mg/dL H mg/dL  





    (7-23)  


 


Creatinine    0.8 mg/dL mg/dL  





    (0.7-1.3)  


 


Estimated GFR    > 60   





    


 


Glucose    96 mg/dL mg/dL  





    ()  


 


Calcium    9.0 mg/dL mg/dL  





    (8.5-10.4)  


 


Urine Color  YELLOW     





    


 


Urine Appearance  CLEAR     





    


 


Urine pH  6.0     





   (5.0-7.5)   


 


Ur Specific Gravity  1.013     





   (1.002-1.030)   


 


Urine Protein  NEGATIVE     





   (NEGATIVE)   


 


Urine Ketones  NEGATIVE     





   (NEGATIVE)   


 


Urine Blood  NEGATIVE     





   (NEGATIVE)   


 


Urine Nitrate  NEGATIVE     





   (NEGATIVE)   


 


Urine Bilirubin  NEGATIVE     





   (NEGATIVE)   


 


Urine Urobilinogen  NEGATIVE EU EU    





   (0.2-1.0)   


 


Ur Leukocyte Esterase  NEGATIVE     





   (NEGATIVE)   


 


Urine Glucose  NEGATIVE     





   (NEGATIVE)   











Medications Given: 


 








Discontinued Medications





Sodium Chloride (Ns)  1,000 mls @ 0 mls/hr IV EDNOW ONE; Wide [facility]


   PRN Reason: Protocol


   Stop: 12/25/18 13:32


   Last Admin: 12/25/18 13:33 Dose:  1,000 mls








Departure





- Departure


Disposition: [facility] Acute


Clinical Impression: 


 Dehydration, Dementia, Gait instability





Condition: Good

## 2018-12-26 RX ADMIN — ENOXAPARIN SODIUM SCH: 100 INJECTION SUBCUTANEOUS at 11:01

## 2018-12-26 RX ADMIN — PRIMIDONE SCH: 50 TABLET ORAL at 22:24

## 2018-12-26 RX ADMIN — ASPIRIN SCH: 81 TABLET, DELAYED RELEASE ORAL at 11:01

## 2018-12-26 NOTE — ASMTCMCOM
CM Note

 

CM Note                       

Notes:

OT/PT evals pending. Unknown if pt will qualify for SNF, unknown if he has skilled need 

now. Shruti with [facility] came to complete on-site and pt was sleeping. Shruti needs to see 

what therapy evals say before accepting pt back. Miriam with [facility] reports pt has had 

cognitive decline, even at  for unknown reason. A [facility] MSW working with 

family, recommended unskilled care and adult day care. TS assessed pt most appropriate for AL 

memory care unit. Miriam does not think pt has skillable goal based on TS assessment, they last saw 

pt Friday, pt declined visit Monday and pt then was walking 1000 ft w/o AD. Of course pt may now 

may have had a change in status. CM to follow. 



D/c plan of care: TBD, back home with TS vs. [facility] SN[facility]

 

Date Signed:  12/26/2018 05:03 PM

Electronically Signed By:ANGELA Rubin

## 2018-12-26 NOTE — HOSPPROG
Hospitalist Progress Note


Assessment/Plan: 


73y male with AMS. First encounter, chart reviewed. 





#Altered Mental Status


- Confused per family, hx of dementia


- minimal arousable on exam


- CT head ordered, reviewed, not abnormal


- UA negative, recent UTI with AMS


- May be related to medications, home medications include Primidone


- Head CT on admission was negative for acute abnormality


- Continue to monitor 


- neurology consult, D/W Dr Magana


- will get cxr and tox screen





#Failure to Thrive/Gait instability (Acute)


- 4 days duration per family


- PT/OT ordered


- CM working on placement 





#Parkinson's Disease 


- Continue home medication when awake





FEN: Regular, IVF 





DVT PPx: Lovenox





Code: FULL





Dispo: Change to inpt status


   requires further evaluation in hospital


   consider MRI for further diagnosis


Subjective: Mininmal arousable. Opens eyes. No verbal interaction.


Objective: 


 Vital Signs











Temp Pulse Resp BP Pulse Ox


 


 37.0 C   73   16   123/79 H  95 


 


 12/26/18 12:08  12/26/18 12:08  12/26/18 12:08  12/26/18 12:08  12/26/18 12:08








 











 12/25/18 12/26/18 12/27/18





 05:59 05:59 05:59


 


Intake Total  1500 


 


Output Total  450 


 


Balance  1050 














- Physical Exam


Constitutional: appears nourished, not in pain, chronically ill appearing


Eyes: PERRL, anicteric sclera, pale conjunctiva


Ears, Nose, Mouth, Throat: moist mucous membranes, ears appear normal, no oral 

mucosal ulcers


Cardiovascular: No JVD, No tachycardia, No edema


Respiratory: no respiratory distress, no rales or rhonchi, reduced air movement


Gastrointestinal: normoactive bowel sounds, No tenderness, No ascites


Skin: warm, normal color, No mottled


Musculoskeletal: normal joint ROM, no joint effusions, generalized weakness


Neurologic: No AAOx3


Psychiatric: not anxious, encephalopathic, other (altered), No interacting 

appropriately, No thought process linear





ICD10 Worksheet


Patient Problems: 


 Problems











Problem Status Onset


 


Dehydration Acute  


 


Urinary tract infection Acute  


 


Sinusitis Acute  


 


Dementia Acute  


 


Gait instability Acute

## 2018-12-26 NOTE — NEUROPROG
Assessment: 








HOSPITAL NEUROLOGY CONSULT


REQUESTING: Kristyn Rai


REASON: AMS





HPI:


73 year old man known to me from clinic who has a history of essential tremor 

who presented to the ED with gait change and AMS.  History from records review 

and discussion with hospitalist team, as he is obtunded now and his step-son is 

not available.  Was admitted last month with UTI and dehydration.  Had done 

well after discharge until 12/20 when he reported to PCP with nausea and 

vomiting and confusion.  Came to the ED yesterday with worsening gait stability 

and confusion/disorientation.  He is now unresponsive today.  No indication of 

focal deficits at any point.  No convulsive activity or adventitious movements 

noted.








ROS:


As per the HPI, otherwise a complete 12 point ROS was performed and is negative





ALLERGIES AND MEDS:


As recorded in the EMR - reviewed and reconciled





PFSH:


As per the intake H&P by Dr. Chase from yesterday





EXAM:


VS reviewed in EMR


He is WDWN and laying in bed with eyes closed.  He is diaphoretic.  He will 

open his eyes to nox stim and name, but nothing more.  He has sonorous 

breathing.  He is not following commands and has no verbal output.  His gaze is 

conjugate, no oculocephalics, pupils 3mm round reactive.  Corneals intact and 

he grimaces to nox stim.  Face symmetric.  His tone is somewhat paratonic.  No 

adventitious movements.  Withdraws to nox stim in the extremities.  DTRs are 

not brisk.  His plantars are down.  No clonus.  Neck is supple.








DATA REVIEW:


Labs reviewed in EMR








PERSONALLY INTERPRETED RESULTS AND DATA:


CT head wo - nothing acute





IMPRESSION AND RECOMMENDATIONS:


Patient is obtunded today, with seemingly gradual progression into this state.  

Ssupect more of a toxic/metabolic state given the gradual worsening.   Had 

recent visit with PCP for N/V.   


Will check MRI brain wow to rule out central structural process like stroke.  

Will also check EEG.  


Ongoing toxic/metabolic/infectious workup per primary team - CXR, UDS in 

process.  GI workup given his recent N/V.  If toxic/metabolic/infectious 

screening negative, then may need to pursue CSF sampling if no improvement.





Objective: 





 Vital Signs











Temp Pulse Resp BP Pulse Ox


 


 37.0 C   73   16   123/79 H  95 


 


 12/26/18 12:08  12/26/18 12:08 12/26/18 12:08 12/26/18 12:08  12/26/18 12:08








 











 12/25/18 12/26/18 12/27/18





 05:59 05:59 05:59


 


Intake Total  1500 


 


Output Total  450 


 


Balance  1050 











Allergies/Adverse Reactions: 


 





No Known Allergies Allergy (Verified 12/25/18 13:30)

## 2018-12-27 RX ADMIN — LACTULOSE SCH GM: 20 SOLUTION ORAL at 22:28

## 2018-12-27 RX ADMIN — PRIMIDONE SCH MG: 50 TABLET ORAL at 22:28

## 2018-12-27 RX ADMIN — LACTULOSE SCH GM: 20 SOLUTION ORAL at 17:21

## 2018-12-27 RX ADMIN — LACTULOSE SCH GM: 20 SOLUTION ORAL at 11:24

## 2018-12-27 RX ADMIN — ASPIRIN SCH MG: 81 TABLET, DELAYED RELEASE ORAL at 11:24

## 2018-12-27 RX ADMIN — ENOXAPARIN SODIUM SCH MG: 100 INJECTION SUBCUTANEOUS at 11:24

## 2018-12-27 NOTE — NEUROPROG
Assessment: 








BACKGROUND 12/26:


73 year old man known to me from clinic who has a history of essential tremor 

who presented to the ED with gait change and AMS.  History from records review 

and discussion with hospitalist team, as he is obtunded now and his step-son is 

not available.  Was admitted last month with UTI and dehydration.  Had done 

well after discharge until 12/20 when he reported to PCP with nausea and 

vomiting and confusion.  Came to the ED yesterday with worsening gait stability 

and confusion/disorientation.  He is now unresponsive today.  No indication of 

focal deficits at any point.  No convulsive activity or adventitious movements 

noted.








INTERVAL HISTORY:


He is now awake, alert and conversant, though still confused.  Up in chair and 

eating breakfast today.





EXAM:


VS reviewed in EMR


He is WDWN, sitting in chair eating breakfast.  Attention seems reduced, he 

tends to pick at himself and his gown.  He is not oriented.  He can follow 

commands with prompting.  Pupils 3mm round reactive.  Full horizontal ocular 

motility.  VFF.  Facial sensation intact.  Face symmetric with activation.  

Tongue protrudes midline.  Able to sustain antigravity in all extremities - 

does not participate in formal segmental exam, but he has good passive 

resistance.  Sensation intact in extremities.  Plantars down. No clonus.








DATA REVIEW:


Labs reviewed in EMR








PERSONALLY INTERPRETED RESULTS AND DATA:


CT head wo - nothing acute


MRI brain wow - nothing acute





IMPRESSION AND RECOMMENDATIONS:


Patient with gradual onset gait instability and encephalopathy progressing to 

obtundation.  He is now markedly improved without much intervention.  Suspect 

more of a toxic/metabolic process - had recent GI illness - ammonia modestly 

elevated.  He remains encephalopathic, but improved, and nothing focal on exam 

and unremarkable MRI brain.  Will get EEG today.  Ongoing toxic/metabolic/

infectious work up per primary team.  Ongoing delirium precautions.











Objective: 





 Vital Signs











Temp Pulse Resp BP Pulse Ox


 


 36.4 C   63   16   133/78 H  96 


 


 12/27/18 08:14  12/27/18 08:14  12/27/18 08:14  12/27/18 08:14  12/27/18 08:14








 Laboratory Results





 12/26/18 18:46 





 











 12/26/18 12/27/18 12/28/18





 05:59 05:59 05:59


 


Intake Total 1500  


 


Output Total 450 425 


 


Balance 1050 -425 











Allergies/Adverse Reactions: 


 





No Known Allergies Allergy (Verified 12/25/18 13:30)

## 2018-12-27 NOTE — CPEEG
DATE OF STUDY:  12/27/2018



INTRODUCTION:  This is a multichannel EEG using the standard international 10-
20 system of disc electrode placement.  A single EKG channel was monitored for 
the duration of the study.  This study was undertaken for evaluation of altered 
mental status.  Recording time was 20 mins.



DESCRIPTION OF RECORDING:  The tracing revealed continuous polymorphic 4 to 5 
hertz delta/theta activity.  Vertical eye blink artifact was noted throughout 
the tracing.  Photic stimulation failed to activate the tracing.  The EKG 
demonstrated normal sinus rhythm.



INTERPRETATION:  This is an abnormal EEG due to generalized slowing in the delta
/theta band widths.



CLINICAL CORRELATION:  This EEG suggests a nonspecific encephalopathy, likely 
of toxic/metabolic origin.  No focal/lateralizing epileptiform discharges.





Job #:  [id number]

MTDD

## 2018-12-27 NOTE — PDMN
Medical Necessity


Medical necessity: Pt meets IP criteria as of 12/26/2018 per MD and [facility] MG-N (

Neurology GRG); est los > 2 mn for ongoing tx and management of altered mental 

status with new gait instability in the setting of Parkinson's disease; 

requiring further work up, neurology consultation and therapies.

## 2018-12-27 NOTE — HOSPPROG
Hospitalist Progress Note


Assessment/Plan: 


73y male with AMS.





#Acute metabolic encephalopathy 


- Confused per family, hx of dementia


- minimal arousable on exam


- CT head normal


- MRI stable


- UA negative, recent UTI with AMS


- Head CT on admission was negative for acute abnormality


- Continue to monitor 


- appreciate neurology consult


- cxr stable


- tox screen negative


-EEG today





#Failure to Thrive/Gait instability (Acute)


- 4 days duration per family


- PT/OT ordered


- CM working on placement 





#Elevated ammonia


-possible etiol of encephalopathy


-US ordered


-lactulose ordered


-follow





#Parkinson's Disease 


- Continue home medication when awake





FEN: Regular, IVF 





DVT PPx: Lovenox





Code: FULL





Dispo:


   requires further evaluation in hospital


   will needs SNF


Subjective: [place], no issues...


Objective: 


 Vital Signs











Temp Pulse Resp BP Pulse Ox


 


 37.1 C   75   16   114/69   95 


 


 12/27/18 12:30  12/27/18 12:30  12/27/18 12:30  12/27/18 12:30  12/27/18 12:30








 Laboratory Results





 12/26/18 18:46 





 











 12/26/18 12/27/18 12/28/18





 05:59 05:59 05:59


 


Output Total  425 


 


Balance  -425 














- Physical Exam


Constitutional: appears nourished, chronically ill appearing


Eyes: PERRL, anicteric sclera


Ears, Nose, Mouth, Throat: moist mucous membranes, hearing normal


Cardiovascular: regular rate and rhythym, No JVD


Respiratory: no respiratory distress, reduced air movement


Gastrointestinal: normoactive bowel sounds, No ascites


Skin: warm, No normal color


Musculoskeletal: no joint effusions, generalized weakness


Neurologic: No AAOx3


Psychiatric: not anxious, encephalopathic, poor insight





ICD10 Worksheet


Patient Problems: 


 Problems











Problem Status Onset


 


Dehydration Acute  


 


Urinary tract infection Acute  


 


Sinusitis Acute  


 


Dementia Acute  


 


Gait instability Acute

## 2018-12-28 RX ADMIN — LACTULOSE SCH GM: 20 SOLUTION ORAL at 16:21

## 2018-12-28 RX ADMIN — ENOXAPARIN SODIUM SCH MG: 100 INJECTION SUBCUTANEOUS at 08:00

## 2018-12-28 RX ADMIN — LACTULOSE SCH GM: 20 SOLUTION ORAL at 07:59

## 2018-12-28 RX ADMIN — ASPIRIN SCH MG: 81 TABLET, DELAYED RELEASE ORAL at 08:00

## 2018-12-28 RX ADMIN — LACTULOSE SCH GM: 20 SOLUTION ORAL at 23:20

## 2018-12-28 NOTE — HOSPPROG
Hospitalist Progress Note


Assessment/Plan: 





73y male with AMS. First encounter, chart reviewed.





#Acute metabolic encephalopathy 


- Confused per family, hx of dementia (off of his baseline)


- CT head normal


- MRI of brain unremarkable


- UA negative, recent UTI with AMS


- ammonia level rising, per nursing staff has had no bowel movement, will get a 

KUB to evaluate since he is essentially non verbal


- will eat and drink w prompting


- EEG shows nonspecific slowing, per neurology unlikely a CNS infection





#Failure to Thrive/Gait instability (Acute)


- 4 days duration per family


- PT/OT ordered


- CM working on placement 





#Elevated ammonia


-possible etiol of encephalopathy


-US shows nothing specific


-lactulose ordered but has had no bowel movement





#Parkinson's Disease (unclear if his symptoms are only tremor-not on 

medications for the Parkinson's)


-has seen Dr Pollock in the past for this


-did not see any medications on his home meds





#plan: will get a abdominal x ray to evaluate, ammonia level is rising





Subjective: Clovis is essentially non verbal during my interview.


Objective: 


 Vital Signs











Temp Pulse Resp BP Pulse Ox


 


 36.7 C   71   16   105/62   96 


 


 12/28/18 07:47  12/28/18 07:47  12/28/18 07:47  12/28/18 07:47  12/28/18 07:47








 Laboratory Results





 12/28/18 05:30 





 











 12/27/18 12/28/18 12/29/18





 05:59 05:59 05:59


 


Intake Total  240 400


 


Output Total 425 500 


 


Balance -425 -260 400














- Physical Exam


Constitutional: no apparent distress


Eyes: PERRL


Cardiovascular: regular rate and rhythym, no murmur, rub, or gallop


Respiratory: no respiratory distress


Gastrointestinal: normoactive bowel sounds


Skin: warm


Neurologic: other (alert, will drink water when handed to him, stares and will 

look at me)


Psychiatric: flat affect





ICD10 Worksheet


Patient Problems: 


 Problems











Problem Status Onset


 


Dehydration Acute  


 


Dementia Acute  


 


Gait instability Acute  


 


Sinusitis Acute  


 


Urinary tract infection Acute

## 2018-12-28 NOTE — NEUROPROG
Assessment: 








BACKGROUND 12/26:


73 year old man known to me from clinic who has a history of essential tremor 

who presented to the ED with gait change and AMS.  History from records review 

and discussion with hospitalist team, as he is obtunded now and his step-son is 

not available.  Was admitted last month with UTI and dehydration.  Had done 

well after discharge until 12/20 when he reported to PCP with nausea and 

vomiting and confusion.  Came to the ED yesterday with worsening gait stability 

and confusion/disorientation.  He is now unresponsive today.  No indication of 

focal deficits at any point.  No convulsive activity or adventitious movements 

noted.








INTERVAL HISTORY:


Continues to be improved and awake/alert, but still with some confusion.  

Ammonia rising.  Lactulose started by primary team.





EXAM:


VS reviewed in EMR


He is WDWN, sitting in chair eating breakfast.  Attention seems reduced, he 

tends to perseverate.  He is not oriented.  He can follow commands with 

prompting.  Pupils 3mm round reactive.  Full horizontal ocular motility.  VFF.  

Facial sensation intact.  Face symmetric with activation.  Tongue protrudes 

midline.  Able to sustain antigravity in all extremities - does not participate 

in formal segmental exam, but he has good passive resistance.  Tremor in the 

LUE noted on sustention today.  Sensation intact in extremities.  Plantars 

down. No clonus.  Neck supple.








DATA REVIEW:


Labs reviewed in EMR


Primidone level < 2.5


Ammonia 54 --> 93


UA neg, UDS neg


TSH .621





PERSONALLY INTERPRETED RESULTS AND DATA:


CT head wo - nothing acute


MRI brain wow - nothing acute


EEG - diffuse slowing, no epileptiform activity





IMPRESSION AND RECOMMENDATIONS:


Patient with gradual onset gait instability and encephalopathy progressing to 

obtundation.  He continues to improve.  


Suspect more of a toxic/metabolic process - had recent GI illness - ammonia 

rising.  


He remains encephalopathic, but improved, and nothing focal on exam and 

unremarkable MRI brain.  EEG shows nonspecific slowing.    Do not suspect CNS 

infection - no neck stiffness, no fevers/leukocytosis, nontoxic appearing.  


Ongoing toxic/metabolic/infectious work up per primary team.  Ongoing delirium 

precautions.  Will hold primidone until he has outpatient follow up, but don't 

really suspect this is culprit in any of his issues.  No further 

recommendations.  Will sign off.











Objective: 





 Vital Signs











Temp Pulse Resp BP Pulse Ox


 


 36.7 C   71   16   105/62   96 


 


 12/28/18 07:47  12/28/18 07:47  12/28/18 07:47  12/28/18 07:47  12/28/18 07:47








 Laboratory Results





 12/28/18 05:30 





 











 12/27/18 12/28/18 12/29/18





 05:59 05:59 05:59


 


Intake Total  240 400


 


Output Total 425 500 


 


Balance -425 -260 400











Allergies/Adverse Reactions: 


 





No Known Allergies Allergy (Verified 12/25/18 13:30)

## 2018-12-29 VITALS — SYSTOLIC BLOOD PRESSURE: 110 MMHG | DIASTOLIC BLOOD PRESSURE: 60 MMHG

## 2018-12-29 RX ADMIN — ASPIRIN SCH: 81 TABLET, DELAYED RELEASE ORAL at 10:03

## 2018-12-29 RX ADMIN — ENOXAPARIN SODIUM SCH MG: 100 INJECTION SUBCUTANEOUS at 07:38

## 2018-12-29 RX ADMIN — LACTULOSE SCH: 20 SOLUTION ORAL at 10:03

## 2018-12-29 NOTE — ASMTDCNOTE
Case Management Discharge

 

Discharge Order Complete?     Answers:  Yes                                   

Patient to Obtain             Answers:  Other                         Notes:  [facility]

Medications                                                                   

Transportation Arranged       Answers:  Other                         Notes:  [place]

Transport will Pick (Date     12/29/2018 12:00 AM

& Time)                       

Faxed Final Orders            Answers:  Yes                                   

Agency/Facility Transfer      Answers:  Yes                                   

Report Printed & Faxed to                                                     

Receiving Agency                                                              

Family Notified               Answers:  Yes                                   

Discharge Comments            

Notes:

D/w NP, final orders faxed. Kendrick shaffer  blanche, RN to call report.

 

Date Signed:  12/29/2018 12:42 PM

Electronically Signed By:Nilda Villaseñor RN

## 2018-12-29 NOTE — PDIAF
- Diagnosis


Diagnosis: acute metabolic encephalopathy, constipation, high ammonia levels


Code Status: Full Code





- Medication Management


Discharge Medications: electronically signed and located in the Home Medication 

List.


PICC Care - Routine: N/A





- Orders


Services needed: Physical Therapy, Occupational Therapy


Diet Recommendation: no restrictions on diet


Diet Texture: Regular Texture Diet


Additional Instructions: 


hold Mysoline until you f/u with your Primary care provider, side effects of 

this medication may have caused sedation


Clovis was very constipated, also had high ammonia levels, continue Lactulose 

until ammonia levels normalize


he needs to be fed and given water regularly, he doesn't know to do this


recommending further discussion about his Code status with his PCP





- Labs/Radiology


Other Lab Name, Date and Time: ammonia level on 1/02/29 and q 3 days till 

stablizes





- Follow Up Care


Current Providers and Referrals: 


Patient,NotPresent [Unknown] - As per Instructions

## 2018-12-29 NOTE — HOSPPROG
Hospitalist Progress Note


Assessment/Plan: 





73y male with AMS. First encounter, chart reviewed.





#Acute metabolic encephalopathy 


-today he is smiling, eating, says "I'm good"


- hx of dementia 


- CT of head stable


- MRI of brain unremarkable


- UA negative, recent UTI with AMS


- his symptoms improved after having multiple large bowel movements


- EEG shows nonspecific slowing, per neurology unlikely a CNS infection





#Failure to Thrive/Gait instability (Acute)


- 4 days duration per family


- PT/OT ordered


- CM working on placement 





#Elevated ammonia


-likely etiol of encephalopathy


-cont lactulose





#constipation


-resolved





#Parkinson's Disease (unclear if his symptoms are only tremor-not on 

medications for the Parkinson's)


-has seen Dr Pollock in the past for this


-did not see any medications on his home meds





#plan: dc to rehab, CM to call son and notify


Subjective: Clovis says he is fine.


Objective: 


 Vital Signs











Temp Pulse Resp BP Pulse Ox


 


 36.3 C   77   18   110/60   94 


 


 12/29/18 07:38  12/29/18 07:38  12/29/18 07:38  12/29/18 07:38  12/29/18 07:38








 Laboratory Results





 12/28/18 05:30 





 











 12/28/18 12/29/18 12/30/18





 05:59 05:59 05:59


 


Intake Total 240 900 


 


Output Total 500  


 


Balance -260 900 














- Physical Exam


Constitutional: not in pain, chronically ill appearing


Eyes: PERRL


Ears, Nose, Mouth, Throat: hearing normal


Cardiovascular: regular rate and rhythym


Respiratory: no respiratory distress


Gastrointestinal: normoactive bowel sounds


Skin: warm


Neurologic: other (oriented to himself)


Psychiatric: interacting appropriately, other (calm and cooperative)





ICD10 Worksheet


Patient Problems: 


 Problems











Problem Status Onset


 


Dehydration Acute  


 


Dementia Acute  


 


Gait instability Acute  


 


Sinusitis Acute  


 


Urinary tract infection Acute

## 2018-12-29 NOTE — GDS
DISCHARGE DIAGNOSES:  

1.  Acute metabolic encephalopathy.

2.  Failure to thrive.

3.  Elevated ammonia level.

4.  Constipation.

5.  Parkinson disease.



CONSULTATION:  Dr. Hamilton Magana.



HISTORY:  Briefly, the patient is a 73-year-old gentleman with a history of 
dementia, recent urinary tract infection, who [facility] to [facility] for altered mental status.  He was recently hospitalized for a UTI and 
dehydration.  He completed 2 weeks of antibiotics.  He was brought in by his 
family because he was confused.  He had a CT of the head that was negative.  In 
addition, he had an MRI that showed no abnormality.  He was seen and evaluated 
by Neurology.  It was recommended that we treat his high ammonia levels.  This 
was done.  He is alert today, oriented to himself.  He is able to eat and drink
, but needs encouragement with this.



HOSPITAL COURSE:  

1.  Acute metabolic encephalopathy.  This is improved.  He had an EEG that 
showed nonspecific slowing.   He will be discharged to a skilled nursing 
facility.

2.  Failure to thrive with gait instability.  PT and OT have been working with 
him.  I am concerned that one of his problems with slow improvement is that it 
does not occur to him to eat.  I have recommended that they feed him all of his 
meals and make sure he is getting plenty of fluids at the rehabilitation 
facility.

3.  Elevated ammonia level.  This is likely the etiology of the encephalopathy.
  Will continue lactulose and have the levels monitored every 3 days until it 
is normalized.

4.  Constipation, resolved.

5.  Parkinson disease.  I am unclear if his only symptom is a tremor.  He is 
not on medications for Parkinson's.  Further followup with Neurology.



DISCHARGE CONDITION:  Stable.  Blood pressure is 110/60, respiratory rate of 18
, O2 sats on room air 94%.  Temperature is 36.3 Celsius.



DISCHARGE MEDICATIONS:  Please see the EMR.



DISCHARGE INSTRUCTIONS:  

1.  Hold the Mysoline until he follows up with his primary care provider.

2.  He was severely constipated and also had high ammonia levels, so it is 
critical that he has regular bowel movements.  Will recommend to continue 
lactulose until his ammonia levels normalize.

3.  He needs help with feeding and drinking fluids.

4.  To further discuss with his primary care provider  his code status.  



Greater than 30 minutes discharging and coordinating the patient's care.





Job #:  [id number]

MTDD

## 2018-12-29 NOTE — ASMTLACE
LACE

 

Length of stay for            Answers:  3 days                                

current admission                                                             

Acuity / Level of             Answers:  Yes                                   

Care: Did the patient                                                         

have an inpatient                                                             

admission?                                                                    

Comorbidities - select        Answers:  Dementia                              

all that apply                                                                

# of Emergency department     Answers:  1-2                                   

visits in the last 6                                                          

months                                                                        

Score: 10

 

Date Signed:  12/29/2018 12:28 PM

Electronically Signed By:Nilda Villaseñor RN

## 2019-01-22 ENCOUNTER — HOSPITAL ENCOUNTER (INPATIENT)
Dept: HOSPITAL 80 - FED | Age: 74
LOS: 5 days | Discharge: NURSING, REHAB OR LONG-TERM CARE | DRG: 642 | End: 2019-01-27
Attending: INTERNAL MEDICINE | Admitting: FAMILY MEDICINE
Payer: COMMERCIAL

## 2019-01-22 DIAGNOSIS — K59.00: ICD-10-CM

## 2019-01-22 DIAGNOSIS — G93.41: ICD-10-CM

## 2019-01-22 DIAGNOSIS — G20: ICD-10-CM

## 2019-01-22 DIAGNOSIS — F01.50: ICD-10-CM

## 2019-01-22 DIAGNOSIS — N39.0: ICD-10-CM

## 2019-01-22 DIAGNOSIS — E86.0: ICD-10-CM

## 2019-01-22 DIAGNOSIS — E72.20: Primary | ICD-10-CM

## 2019-01-22 LAB — PLATELET # BLD: 157 10^3/UL (ref 150–400)

## 2019-01-22 RX ADMIN — RIFAXIMIN SCH MG: 550 TABLET ORAL at 22:18

## 2019-01-22 NOTE — CPEKG
Test Reason : OPEN

Blood Pressure : ***/*** mmHG

Vent. Rate : 054 BPM     Atrial Rate : 054 BPM

   P-R Int : 184 ms          QRS Dur : 101 ms

    QT Int : 460 ms       P-R-T Axes : 026 026 049 degrees

   QTc Int : 436 ms

 

Sinus rhythm

Abnormal R-wave progression, early transition

Borderline T abnormalities, anterior leads

 

Confirmed by Rich Aguilar ([id number]) on 1/22/2019 2:40:59 PM

 

Referred By: RICH AGUILAR           Confirmed By:Rich Aguilar

## 2019-01-22 NOTE — PDMN
Medical Necessity


Medical necessity: Pt meets IP criteria as of 1/22/12019 per MD and [facility] M-570 (

Liver Disease Complications); est los > 2 mn for ongoing tx and management of 

hepatic encephalopathy with hyperammonemia as well as UTI and prerenal azotemia.

## 2019-01-22 NOTE — GHP
DATE OF ADMISSION:  01/22/2019



CHIEF COMPLAINT:  Altered mental status.



HISTORY OF PRESENT ILLNESS:  This is a 73-year-old male, who was brought to the 
emergency department from [facility] after he was found to be altered.  [facility] with a diagnosis of 
encephalopathy due to hyperammonemia.  Per ER report, he had not woken up for 
16 hours.  During the time of my exam, the patient is nonverbal.  All history 
was obtained via ER report and review of hospital medical records. 



The patient has a MOST form in the chart where it states he would like to be on 
comfort measures.



PAST MEDICAL HISTORY:   Essential tremor, hyperammonemia, dementia.



PAST SURGICAL HISTORY:  Unobtainable.



HOME MEDICATIONS:  Reviewed.  Refer to [employer] for details.



ALLERGIES:  No known drug allergies.



SOCIAL HISTORY:  The patient resides at [facility].



FAMILY HISTORY:  Unobtainable.



REVIEW OF SYSTEMS:  Comprehensive 10-point review of systems was attempted.  
However, it was unobtainable due to patient's decreased mentation and nonverbal 
status.



PHYSICAL EXAM:  VITAL SIGNS:  Blood pressure 130/69, pulse of 85, respiratory 
rate 16, O2 saturation 98% on room air.  Temperature afebrile.  GENERAL:  No 
acute distress.  HEAD:  Atraumatic, normocephalic.  EYES:  Are PERRLA.  Pupils 
are constricted and minimally reactive.  MOUTH:  Dry oral mucosa.  NECK:  
Supple.  No lymphadenopathy.  CARDIOVASCULAR:  S1-S2 no murmurs, rubs, clicks, 
gallops.  No JVD.  No lower extremity edema.  PULMONARY:  Lungs are clear.  No 
wheezes, rales, or rhonchi.  ABDOMEN:  Soft, nontender, nondistended.  No 
guarding or rebound tenderness.  Normoactive bowel sounds.  EXTREMITIES:  No 
clubbing or cyanosis.  NEURO:  The patient does not follow commands.  He is 
nonverbal.  SKIN:  Clear.  No rashes.  No obvious jaundice.



DIAGNOSTICS:  WBC is 3.38, hemoglobin 13.4, hematocrit 38.1, platelets 157, 
sodium 138, potassium 3.8, chloride 113, BUN 28, creatinine 0.7, glucose 83, 
ammonia 147.  Total protein 5.6, albumin 2.8, UA 2+ leukocyte esterase, 25 to 
50 WBCs.  1+ bacteria.  Urine culture from last hospital stay grew enterococcus 
faecalis, low colony count of 9000 colonies.



ASSESSMENT AND PLAN:  This is a 73-year-old male, on comfort care presenting 
from [facility] with:

1.  Acute metabolic encephalopathy.  Most likely due to hyperammonemia in the 
setting of acute infection.

2.  Suspected urinary tract infection with history of Enterococcus faecalis in 
his urine.

3.  Suspected dehydration with prerenal azotemia.  Manifested by a BUN of 28, 
creatinine 0.7.

4. Hepatic Encephalopathy/Hyperammonemia of unclear etiology. The last hospital 
stay it appears that the elevated ammonia was attributed to constipation. He 
should have a GI consult prior to discharge. The patient's PCP Dr. Antonio ([phone]
[phone]) is requesting that the etiology of the hyperammonemia be worked up 
which I agree with. 



PLAN:  

1.  The patient will be admitted to the hospital floor on comfort care.

2.  IV fluids.

3.  Will order rifaximin in addition to his usual dose of lactulose and MiraLAX.

4.  Will start empiric antibiotics to treat his UTI with Rocephin for now, 
knowing that this will not cover Enterococcus.  If his condition deteriorate or 
is not improving, consider adding vancomycin.



The patient is DNR status.  I would encourage my covering colleague to discuss 
goals of care with the patient's family in the morning.



Job #:  [id number]

MTDD

## 2019-01-22 NOTE — EDPHY
H & P


Time Seen by Provider: 01/22/19 14:10


HPI/ROS: 





CHIEF COMPLAINT:  Decreased level of alertness





HISTORY OF PRESENT ILLNESS:  73-year-old man was hospitalized recently and 

discharged on December 29th with encephalopathy and elevated ammonia.  He is 

brought in by EMS because he can't wake up over the past 16 hr.  Pre-hospital 

glucose 81.  No history of trauma.





R comprehensive 10 point review of systems and further history is unobtainable 

because the patient is nonverbal on arrival.





PAST MEDICAL HISTORY:  Includes dementia, Parkinson's, hyperammonemia related 

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





Social history:  Resident of [facility]





General Appearance:  Patient is obtunded.


Eyes:  Pupils are reactive.  


ENT, Mouth:  Dry mucous membranes.


Respiratory: Normal respiratory effort, breath sounds equal, lungs are clear to 

auscultation.


Cardiovascular:  Regular rate and rhythm.


Gastrointestinal:  Abdomen is soft and non tender.


Neurological:  Patient is obtunded with no movement of extremities and will not 

open his eyes spontaneously.  He does have spontaneous respirations.


Skin: Warm and dry, no rashes.  No petechiae or purpura.


Musculoskeletal:  No meningeal signs, no neck stiffness.


Psychiatric:  Unable, nonverbal





Emergency Department course/MDM:





Plan for head CT, labs to include ammonia.


12-lead EKG interpreted by me; official reading is in computer system.  My 

interpretation is sinus rhythm rate 54 with early RS transition.


1517:  Head CT negative per April, urinalysis positive will treat with 

antibiotics.


Discussed with Dr. Miller.


IV fluids, IV ceftriaxone, inpatient treatment.


1705: Dr. Zhu his PCP called, requests neurology evaluation, Angela notified 

of her request and will call her directly to discuss.


Smoking Status: Never smoked


Constitutional: 


 Initial Vital Signs











Temperature (C)  36.2 C   01/22/19 14:16


 


Heart Rate  54 L  01/22/19 14:16


 


Respiratory Rate  16   01/22/19 14:16


 


Blood Pressure  122/71 H  01/22/19 14:16


 


O2 Sat (%)  97   01/22/19 14:16








 











O2 Delivery Mode               Room Air














Allergies/Adverse Reactions: 


 





No Known Allergies Allergy (Verified 01/22/19 14:16)


 








Home Medications: 














 Medication  Instructions  Recorded


 


Aspirin EC [Aspirin EC 81 mg (*)] 81 mg PO DAILY 12/25/18


 


Acetaminophen [Tylenol 325mg (*)] 650 mg PO Q4HRS PRN  tab 12/29/18


 


Polyethylene Glycol 3350 [Miralax 17 gm PO DAILY  pkt 12/29/18





17 gm (*)]  


 


Lactulose [Kristalose] 20 gm PO TID PRN 01/22/19














Medical Decision Making





- Diagnostics


Imaging Results: 


 Imaging Impressions





Head CT  01/22/19 14:21


Impression:


1. No acute intracranial findings. If symptoms persist and clinical suspicion 

warrants, consider MRI.


2. Diffuse cerebral atrophy with periventricular and subcortical low 

attenuation consistent with chronic microvascular ischemic gliosis.


3. Additional findings as above.


 


Findings discussed with RICH AGUILAR 1/22/2019 at 15:15.  











Imaging: Discussed imaging studies w/ On call Radiologist


Differential Diagnosis: 





Differential for altered mental status considered including but not limited to 

intracranial mass or bleed, CNS infection, metabolic abnormality, medication 

side effect





- Data Points


Laboratory Results: 


 Laboratory Results





 01/22/19 14:30 





 01/22/19 14:30 





 











  01/22/19 01/22/19 01/22/19





  14:45 14:30 14:30


 


WBC      





    


 


RBC      





    


 


Hgb      





    


 


Hct      





    


 


MCV      





    


 


MCH      





    


 


MCHC      





    


 


RDW      





    


 


Plt Count      





    


 


MPV      





    


 


Neut % (Auto)      





    


 


Lymph % (Auto)      





    


 


Mono % (Auto)      





    


 


Eos % (Auto)      





    


 


Baso % (Auto)      





    


 


Nucleat RBC Rel Count      





    


 


Absolute Neuts (auto)      





    


 


Absolute Lymphs (auto)      





    


 


Absolute Monos (auto)      





    


 


Absolute Eos (auto)      





    


 


Absolute Basos (auto)      





    


 


Absolute Nucleated RBC      





    


 


Immature Gran %      





    


 


Immature Gran #      





    


 


Sodium      138 mEq/L mEq/L





     (135-145) 


 


Potassium      3.8 mEq/L mEq/L





     (3.5-5.2) 


 


Chloride      113 mEq/L H mEq/L





     () 


 


Carbon Dioxide      22 mEq/l mEq/l





     (22-31) 


 


Anion Gap      3 mEq/L L mEq/L





     (6-14) 


 


BUN      28 mg/dL H mg/dL





     (7-23) 


 


Creatinine      0.7 mg/dL mg/dL





     (0.7-1.3) 


 


Estimated GFR      > 60 





    


 


Glucose      83 mg/dL mg/dL





     () 


 


Calcium      8.6 mg/dL mg/dL





     (8.5-10.4) 


 


Total Bilirubin      0.9 mg/dL mg/dL





     (0.1-1.4) 


 


Conjugated Bilirubin      0.4 mg/dL mg/dL





     (0.0-0.5) 


 


Unconjugated Bilirubin      0.5 mg/dL mg/dL





     (0.0-1.1) 


 


AST      29 IU/L IU/L





     (17-59) 


 


ALT      29 IU/L IU/L





     (21-72) 


 


Alkaline Phosphatase      69 IU/L IU/L





     () 


 


Ammonia    147.0 uMOL/L H uMOL/L  





    (9.0-30.0)  


 


Total Protein      5.6 g/dL L g/dL





     (6.3-8.2) 


 


Albumin      2.8 g/dL L g/dL





     (3.5-5.0) 


 


Urine Color  REUBEN     





    


 


Urine Appearance  HAZY     





    


 


Urine pH  5.0     





   (5.0-7.5)   


 


Ur Specific Gravity  1.020     





   (1.002-1.030)   


 


Urine Protein  NEGATIVE     





   (NEGATIVE)   


 


Urine Ketones  NEGATIVE     





   (NEGATIVE)   


 


Urine Blood  NEGATIVE     





   (NEGATIVE)   


 


Urine Nitrate  NEGATIVE     





   (NEGATIVE)   


 


Urine Bilirubin  NEGATIVE     





   (NEGATIVE)   


 


Urine Urobilinogen  4.0 EU H EU    





   (0.2-1.0)   


 


Ur Leukocyte Esterase  2+  H     





   (NEGATIVE)   


 


Urine RBC  5-10 /hpf H /hpf    





   (0-3)   


 


Urine WBC  25-50 /hpf H /hpf    





   (0-3)   


 


Ur Epithelial Cells  NONE SEEN /lpf /lpf    





   (NONE-1+)   


 


Urine Bacteria  1+ /hpf H /hpf    





   (NONE SEEN)   


 


Urine Mucus  TRACE /lpf /lpf    





   (NONE-1+)   


 


Urine Glucose  NEGATIVE     





   (NEGATIVE)   














  01/22/19





  14:30


 


WBC  3.38 10^3/uL L 10^3/uL





   (3.80-9.50) 


 


RBC  4.19 10^6/uL L 10^6/uL





   (4.40-6.38) 


 


Hgb  13.4 g/dL L g/dL





   (13.7-17.5) 


 


Hct  38.1 % L %





   (40.0-51.0) 


 


MCV  90.9 fL fL





   (81.5-99.8) 


 


MCH  32.0 pg pg





   (27.9-34.1) 


 


MCHC  35.2 g/dL g/dL





   (32.4-36.7) 


 


RDW  13.1 % %





   (11.5-15.2) 


 


Plt Count  157 10^3/uL 10^3/uL





   (150-400) 


 


MPV  9.8 fL fL





   (8.7-11.7) 


 


Neut % (Auto)  37.8 % L %





   (39.3-74.2) 


 


Lymph % (Auto)  44.7 % %





   (15.0-45.0) 


 


Mono % (Auto)  10.1 % %





   (4.5-13.0) 


 


Eos % (Auto)  6.5 % %





   (0.6-7.6) 


 


Baso % (Auto)  0.6 % %





   (0.3-1.7) 


 


Nucleat RBC Rel Count  0.0 % %





   (0.0-0.2) 


 


Absolute Neuts (auto)  1.28 10^3/uL L 10^3/uL





   (1.70-6.50) 


 


Absolute Lymphs (auto)  1.51 10^3/uL 10^3/uL





   (1.00-3.00) 


 


Absolute Monos (auto)  0.34 10^3/uL 10^3/uL





   (0.30-0.80) 


 


Absolute Eos (auto)  0.22 10^3/uL 10^3/uL





   (0.03-0.40) 


 


Absolute Basos (auto)  0.02 10^3/uL 10^3/uL





   (0.02-0.10) 


 


Absolute Nucleated RBC  0.00 10^3/uL 10^3/uL





   (0-0.01) 


 


Immature Gran %  0.3 % %





   (0.0-1.1) 


 


Immature Gran #  0.01 10^3/uL 10^3/uL





   (0.00-0.10) 


 


Sodium  





  


 


Potassium  





  


 


Chloride  





  


 


Carbon Dioxide  





  


 


Anion Gap  





  


 


BUN  





  


 


Creatinine  





  


 


Estimated GFR  





  


 


Glucose  





  


 


Calcium  





  


 


Total Bilirubin  





  


 


Conjugated Bilirubin  





  


 


Unconjugated Bilirubin  





  


 


AST  





  


 


ALT  





  


 


Alkaline Phosphatase  





  


 


Ammonia  





  


 


Total Protein  





  


 


Albumin  





  


 


Urine Color  





  


 


Urine Appearance  





  


 


Urine pH  





  


 


Ur Specific Gravity  





  


 


Urine Protein  





  


 


Urine Ketones  





  


 


Urine Blood  





  


 


Urine Nitrate  





  


 


Urine Bilirubin  





  


 


Urine Urobilinogen  





  


 


Ur Leukocyte Esterase  





  


 


Urine RBC  





  


 


Urine WBC  





  


 


Ur Epithelial Cells  





  


 


Urine Bacteria  





  


 


Urine Mucus  





  


 


Urine Glucose  





  











Medications Given: 


 








Discontinued Medications





Sodium Chloride (Ns)  1,000 mls @ 0 mls/hr IV ONCE ONE; Wide Open


   PRN Reason: Protocol


   Stop: 01/22/19 14:22


   Last Admin: 01/22/19 14:46 Dose:  1,000 mls


Ceftriaxone Sodium/Dextrose (Rocephin 1 Gm (Premix))  50 mls @ 100 mls/hr IV 

EDNOW ONE


   PRN Reason: Protocol


   Stop: 01/22/19 15:50


   Last Admin: 01/22/19 15:40 Dose:  50 mls


Sodium Chloride (Ns)  1,000 mls @ 0 mls/hr IV EDNOW ONE; Wide Open


   PRN Reason: Protocol


   Stop: 01/22/19 15:22


   Last Admin: 01/22/19 15:41 Dose:  1,000 mls








Departure





- Departure


Disposition: [facility]


Clinical Impression: 


 Encephalopathy acute, Hyperammonemia





Urinary tract infection


Qualifiers:


 Urinary tract infection type: site unspecified Hematuria presence: without 

hematuria Qualified Code(s): N39.0 - Urinary tract infection, site not specified





Condition: Fair

## 2019-01-23 LAB — CK SERPL-CCNC: 166 IU/L (ref 0–224)

## 2019-01-23 RX ADMIN — ENOXAPARIN SODIUM SCH MG: 100 INJECTION SUBCUTANEOUS at 09:20

## 2019-01-23 RX ADMIN — ASPIRIN SCH MG: 81 TABLET, DELAYED RELEASE ORAL at 09:21

## 2019-01-23 RX ADMIN — SODIUM CHLORIDE SCH MLS: 900 INJECTION, SOLUTION INTRAVENOUS at 03:00

## 2019-01-23 RX ADMIN — RIFAXIMIN SCH MG: 550 TABLET ORAL at 09:20

## 2019-01-23 RX ADMIN — RIFAXIMIN SCH MG: 550 TABLET ORAL at 20:49

## 2019-01-23 RX ADMIN — RIFAXIMIN SCH MG: 550 TABLET ORAL at 20:34

## 2019-01-23 RX ADMIN — POLYETHYLENE GLYCOL 3350 SCH GM: 17 POWDER, FOR SOLUTION ORAL at 09:21

## 2019-01-23 NOTE — ASMTCMCOM
CM Note

 

CM Note                       

Notes:

Patient plan of care reviewed in am rounds.  73 Year old admitted with decreased LOC. Ammonia level 


elevated recently discharged in late December after being treated for UTI. Patient resides at [facility] and will likely return there when discharged. CM to follow for needs.



Plan: TBD

 

Date Signed:  01/23/2019 02:08 PM

Electronically Signed By:Nargis Dillon RN

## 2019-01-23 NOTE — HOSPPROG
Hospitalist Progress Note


Assessment/Plan: 





72 yo M w recent admissions for confusion w unexplauined elevated ammonia level





hyperammonemia: uncertain cause


   he is too old to have this be a nitrogen cycle defect


   no clinically apparent liver disease


      1. chec k inr


      2. repeat CK


      3. treat constipation





toxic metabolic encephalopathy: multifactorial


   treat uti


   constipation


   minimize other meds


   ekg w sinus mariaa (Interp by me)- follow on telemetry


   head ct w atrophy





proph: scd's





risk: high





code: dnr








Subjective: alert but confused


Objective: 


 Vital Signs











Temp Pulse Resp BP Pulse Ox


 


 36.4 C   83   16   107/55 L  95 


 


 01/23/19 16:00  01/23/19 16:00  01/23/19 16:00  01/23/19 16:00  01/23/19 16:00








 











 01/22/19 01/23/19 01/24/19





 05:59 05:59 05:59


 


Intake Total  2200 


 


Output Total  600 


 


Balance  1600 














- Physical Exam


Constitutional: no apparent distress, appears nourished


Eyes: PERRL, anicteric sclera


Ears, Nose, Mouth, Throat: moist mucous membranes, hearing normal


Cardiovascular: regular rate and rhythym, no murmur, rub, or gallop


Respiratory: no respiratory distress, no rales or rhonchi


Gastrointestinal: normoactive bowel sounds, soft, non-tender abdomen


Genitourinary: No fuentes in urethra


Skin: warm, normal color


Musculoskeletal: No full muscle strength


Neurologic: No AAOx3


Psychiatric: No interacting appropriately





ICD10 Worksheet


Patient Problems: 


 Problems











Problem Status Onset


 


Encephalopathy acute Acute  


 


Hyperammonemia Acute  


 


Urinary tract infection Acute  


 


Dehydration Acute  


 


Dementia Acute  


 


Gait instability Acute  


 


Sinusitis Acute

## 2019-01-24 LAB
INR PPP: 1.34 (ref 0.83–1.16)
PROTHROMBIN TIME: 16.8 SEC (ref 12–15)

## 2019-01-24 RX ADMIN — ASPIRIN SCH MG: 81 TABLET, DELAYED RELEASE ORAL at 09:55

## 2019-01-24 RX ADMIN — RIFAXIMIN SCH: 550 TABLET ORAL at 11:13

## 2019-01-24 RX ADMIN — SODIUM CHLORIDE SCH MLS: 900 INJECTION, SOLUTION INTRAVENOUS at 18:28

## 2019-01-24 RX ADMIN — POLYETHYLENE GLYCOL 3350 SCH GM: 17 POWDER, FOR SOLUTION ORAL at 09:35

## 2019-01-24 RX ADMIN — ENOXAPARIN SODIUM SCH MG: 100 INJECTION SUBCUTANEOUS at 10:02

## 2019-01-24 RX ADMIN — RIFAXIMIN SCH MG: 550 TABLET ORAL at 20:28

## 2019-01-24 NOTE — HOSPPROG
Hospitalist Progress Note


Assessment/Plan: 





72 yo M w recent admissions for confusion w unexplauined elevated ammonia level





hyperammonemia: uncertain cause


   he is too old to have this be a nitrogen cycle defect


   no clinically apparent liver disease-recent ultrasound of liver pretty normal


      1. check inr


      2. ck normal


      3. treat constipation





toxic metabolic encephalopathy: multifactorial


   treat uti


   constipation


   minimize other meds


   ekg w sinus mariaa (Interp by me)- follow on telemetry


   head ct w atrophy





constipation: repeat mag citrate





svt: artifact





proph: scd's





risk: high





code: dnr








Subjective: transferred to PCU for SVT last evening.  tele interp by me- no SVT

, all aartifact from tremor.  no pauses.  alert, confused


Objective: 


 Vital Signs











Temp Pulse Resp BP Pulse Ox


 


 36.7 C   70   14   119/69   94 


 


 01/24/19 08:00  01/24/19 08:00  01/24/19 08:00  01/24/19 08:00  01/24/19 05:08








 











 01/23/19 01/24/19 01/25/19





 05:59 05:59 05:59


 


Intake Total 2200 1200 


 


Output Total 600 250 250


 


Balance 1600 950 -250














- Physical Exam


Constitutional: no apparent distress, appears nourished


Eyes: PERRL, anicteric sclera


Ears, Nose, Mouth, Throat: moist mucous membranes, hearing normal


Cardiovascular: regular rate and rhythym, no murmur, rub, or gallop


Respiratory: no respiratory distress, no rales or rhonchi


Gastrointestinal: normoactive bowel sounds, soft, non-tender abdomen


Genitourinary: No fuentes in urethra


Skin: warm, normal color


Musculoskeletal: No full muscle strength


Neurologic: No AAOx3


Psychiatric: No interacting appropriately





ICD10 Worksheet


Patient Problems: 


 Problems











Problem Status Onset


 


Encephalopathy acute Acute  


 


Hyperammonemia Acute  


 


Urinary tract infection Acute  


 


Dehydration Acute  


 


Dementia Acute  


 


Gait instability Acute  


 


Sinusitis Acute

## 2019-01-24 NOTE — ASMTCMCOM
CM Note

 

CM Note                       

Notes:

Patient plan of care reviewed for discharge planning. Currently on PCU for monitoring. No therapies 


ordered. CM to follow for needs. 



Plan: Anticipate back to [facility] when medically stable for discharge.

 

Date Signed:  01/24/2019 01:49 PM

Electronically Signed By:Nargis Dillon RN

## 2019-01-25 RX ADMIN — ENOXAPARIN SODIUM SCH MG: 100 INJECTION SUBCUTANEOUS at 10:13

## 2019-01-25 RX ADMIN — RIFAXIMIN SCH: 550 TABLET ORAL at 22:05

## 2019-01-25 RX ADMIN — SODIUM CHLORIDE SCH MLS: 900 INJECTION, SOLUTION INTRAVENOUS at 15:11

## 2019-01-25 RX ADMIN — SODIUM CHLORIDE SCH MLS: 900 INJECTION, SOLUTION INTRAVENOUS at 06:18

## 2019-01-25 RX ADMIN — HALOPERIDOL LACTATE PRN MG: 5 INJECTION, SOLUTION INTRAMUSCULAR at 22:45

## 2019-01-25 RX ADMIN — SODIUM CHLORIDE SCH MLS: 900 INJECTION, SOLUTION INTRAVENOUS at 19:13

## 2019-01-25 RX ADMIN — ASPIRIN SCH MG: 81 TABLET, DELAYED RELEASE ORAL at 10:06

## 2019-01-25 RX ADMIN — RIFAXIMIN SCH MG: 550 TABLET ORAL at 10:06

## 2019-01-25 RX ADMIN — POLYETHYLENE GLYCOL 3350 SCH GM: 17 POWDER, FOR SOLUTION ORAL at 10:13

## 2019-01-25 NOTE — HOSPPROG
Hospitalist Progress Note


Assessment/Plan: 





73 year old male admitted with encephalopathy, with hyperammonemia





hyperammonemia:etiology to be determined. INR slightly elevated. ammonia 

continues to be elevated. On rifaximin and lactulose. still confused to me. No 

known history of liver disease. 


-continue xifaxin


-cont lactulose


-treat underlying uti


-consult GI


-monitor ammonia level


-minimize sedating medications





constipation: repeat mag citrate, cont lactulose, rifaximin





svt: artifact, due to tremor in setting of undelrying PD





proph: scd's





risk: high





code: dnr


Subjective: does not appear to be in pain. Mumbling partial words. appears 

comfortable.


Objective: 


 Vital Signs











Temp Pulse Resp BP Pulse Ox


 


 36.6 C   65   18   122/63 H  95 


 


 01/25/19 08:00  01/25/19 08:00  01/25/19 08:00  01/25/19 08:00  01/25/19 08:00








 Laboratory Results





 01/25/19 03:35 





 











 01/24/19 01/25/19 01/26/19





 05:59 05:59 05:59


 


Intake Total 1200 2290 480


 


Output Total 250 350 275


 


Balance 950 1940 205








 











PT  16.8 SEC (12.0-15.0)  H  01/24/19  10:48    


 


INR  1.34  (0.83-1.16)  H  01/24/19  10:48    














- Physical Exam


Constitutional: no apparent distress, appears nourished, not in pain


Eyes: PERRL, anicteric sclera, EOMI


Ears, Nose, Mouth, Throat: moist mucous membranes, hearing normal, ears appear 

normal, no oral mucosal ulcers


Cardiovascular: regular rate and rhythym, no murmur, rub, or gallop


Respiratory: no respiratory distress, no rales or rhonchi, clear to auscultation


Gastrointestinal: normoactive bowel sounds, soft, non-tender abdomen, no 

palpable masses


Genitourinary: no bladder fullness, no bladder tenderness, no renal bruits


Skin: no rashes or abrasions, no fluctuance, no induration


Musculoskeletal: full muscle strength, no muscle tenderness, normal joint ROM


Neurologic: other (patient not responding appropriately to my questions this 

morning. unable to assess but no obvious focal deficit)


Psychiatric: interacting appropriately, not anxious, not encephalopathic, 

thought process linear


Lymph, Heme, Immunologic: no cervical LAD, no supraclavicular LAD





ICD10 Worksheet


Patient Problems: 


 Problems











Problem Status Onset


 


Encephalopathy acute Acute  


 


Hyperammonemia Acute  


 


Urinary tract infection Acute  


 


Dehydration Acute  


 


Dementia Acute  


 


Gait instability Acute  


 


Sinusitis Acute

## 2019-01-26 LAB — PLATELET # BLD: 144 10^3/UL (ref 150–400)

## 2019-01-26 RX ADMIN — ENOXAPARIN SODIUM SCH MG: 100 INJECTION SUBCUTANEOUS at 10:06

## 2019-01-26 RX ADMIN — SODIUM CHLORIDE SCH MLS: 900 INJECTION, SOLUTION INTRAVENOUS at 20:52

## 2019-01-26 RX ADMIN — ASPIRIN SCH MG: 81 TABLET, DELAYED RELEASE ORAL at 10:07

## 2019-01-26 RX ADMIN — RIFAXIMIN SCH: 550 TABLET ORAL at 20:18

## 2019-01-26 RX ADMIN — HALOPERIDOL LACTATE PRN MG: 5 INJECTION, SOLUTION INTRAMUSCULAR at 23:10

## 2019-01-26 RX ADMIN — RIFAXIMIN SCH MG: 550 TABLET ORAL at 10:06

## 2019-01-26 RX ADMIN — POLYETHYLENE GLYCOL 3350 SCH GM: 17 POWDER, FOR SOLUTION ORAL at 10:18

## 2019-01-26 NOTE — HOSPPROG
Hospitalist Progress Note


Assessment/Plan: 





73 year old male admitted with encephalopathy, with hyperammonemia and UTI. 

completed 5 days of rocephin for UTI. 





hyperammonemia:etiology to be determined. INR slightly elevated. ammonia 

continues to be elevated. On rifaximin and lactulose. still confused to me. No 

known history of liver disease. 


-continue xifaxin


-cont lactulose


-treat underlying uti


-Awaiting GI evaluation of the etiology of his hyperammonemia. 


-monitor ammonia level


-minimize sedating medications





constipation: repeat mag citrate, cont lactulose, rifaximin





svt: artifact, due to tremor in setting of undelrying PD





proph: scd's





risk: high





code: dnr


Subjective: patient appears comfortable. has not voiced any complaints.


Objective: 


 Vital Signs











Temp Pulse Resp BP Pulse Ox


 


 36.6 C   67   18   100/65   95 


 


 01/26/19 08:00  01/26/19 08:00  01/26/19 08:00  01/26/19 08:00  01/26/19 08:00








 Laboratory Results





 01/26/19 09:35 





 01/26/19 09:35 





 











 01/25/19 01/26/19 01/27/19





 05:59 05:59 05:59


 


Intake Total 2290 2536 


 


Output Total 350 275 


 


Balance 1940 2261 








 











PT  16.8 SEC (12.0-15.0)  H  01/24/19  10:48    


 


INR  1.34  (0.83-1.16)  H  01/24/19  10:48    














- Physical Exam


Constitutional: no apparent distress, appears nourished, not in pain


Eyes: PERRL, anicteric sclera, EOMI


Ears, Nose, Mouth, Throat: moist mucous membranes, hearing normal, ears appear 

normal, no oral mucosal ulcers


Cardiovascular: regular rate and rhythym, no murmur, rub, or gallop


Respiratory: no respiratory distress, no rales or rhonchi, clear to auscultation


Gastrointestinal: normoactive bowel sounds, soft, non-tender abdomen, no 

palpable masses


Genitourinary: no bladder fullness, no bladder tenderness, no renal bruits


Skin: no rashes or abrasions, no fluctuance, no induration


Musculoskeletal: full muscle strength, no muscle tenderness, normal joint ROM


Neurologic: sensation intact bilaterally, other (patient confused. not able to 

assess)


Psychiatric: not anxious, encephalopathic


Lymph, Heme, Immunologic: no cervical LAD, no supraclavicular LAD





ICD10 Worksheet


Patient Problems: 


 Problems











Problem Status Onset


 


Encephalopathy acute Acute  


 


Hyperammonemia Acute  


 


Urinary tract infection Acute  


 


Dehydration Acute  


 


Dementia Acute  


 


Gait instability Acute  


 


Sinusitis Acute

## 2019-01-26 NOTE — ASMTCMCOM
CM Note

 

CM Note                       

Notes:

Plan of care for discharge reviewed, Plan back to [facility] when medically cleared, updates via 

allscripts.



Plan: Dc to SNF when medically cleared for discharge to home.

 

Date Signed:  01/26/2019 12:42 PM

Electronically Signed By:Nargis Dillon RN

## 2019-01-27 VITALS — SYSTOLIC BLOOD PRESSURE: 125 MMHG | DIASTOLIC BLOOD PRESSURE: 67 MMHG

## 2019-01-27 LAB — PLATELET # BLD: 125 10^3/UL (ref 150–400)

## 2019-01-27 RX ADMIN — POLYETHYLENE GLYCOL 3350 SCH GM: 17 POWDER, FOR SOLUTION ORAL at 13:20

## 2019-01-27 RX ADMIN — ENOXAPARIN SODIUM SCH: 100 INJECTION SUBCUTANEOUS at 13:31

## 2019-01-27 RX ADMIN — RIFAXIMIN SCH MG: 550 TABLET ORAL at 13:20

## 2019-01-27 RX ADMIN — ENOXAPARIN SODIUM SCH MG: 100 INJECTION SUBCUTANEOUS at 13:20

## 2019-01-27 RX ADMIN — ASPIRIN SCH MG: 81 TABLET, DELAYED RELEASE ORAL at 13:20

## 2019-01-27 NOTE — PDIAF
- Diagnosis


Code Status: Do Not Resuscitate (Patient is comfort care and should not be sent 

to hospital unless this is changed.)





- Medication Management


Discharge Medications: electronically signed and located in the Home Medication 

List.





- Orders


Services needed: Registered Nurse, Physical Therapy, Occupational Therapy


Diet Recommendation: no restrictions on diet


Diet Texture: Regular Texture Diet, Non Oral Meds


Additional Instructions: 


Patient POA has filled out a MOST form which states patient is COMFORT CARE. 

Patient should not be sent to acute care hospital for treatment unless it is 

with comfort care goals in mind. 





- Follow Up Care


Current Providers and Referrals: 


Patient,NotPresent [Unknown] - As per Instructions

## 2019-01-27 NOTE — PDDCSUM
Discharge Summary


Discharge Summary: 








Discharge diagnosis


Dementia


Encephalopathy


Urinary tract infection


Tremor


Hyperammonemia


Constipation








Patient is a 73-year-old male with past medical history of dementia, tremor, 

hypertension admitted with worsening encephalopathy.  Patient has previous 

admissions and this was thought to be due to a condition of hyperammonemia.  He 

also had a urinary tract infection.  He was started on antibiotics and treated 

with Rocephin for a 5 day course.  He was started on rifaximin and lactulose 

for treatment of his hyperammonemia.  Over the course of his stay his 

encephalopathy improved and he returned to his baseline level of cognition.  

Gastroenterology was consulted for evaluation of the etiology of his 

hyperammonemia.  However because the patient has a most form stating is comfort 

care the decision to perform any invasive procedures to elucidate the etiology 

of this condition were not pursued.  He was discharged back to [facility] in 

good condition.





Of note the patient has a most form signed by his power of  his son 

that states that he is comfort care only.  An attempt was made to reach out to 

his POA multiple times but his phone did not have voicemail and nobody ever 

picked up.  I also reached out to his primary care physician to let them know 

that the patient is now comfort care.








Over 30 min were spent on the discharge of this patient including discharge 

planning, discussing the discharge with his primary care physician and 

arranging follow-up

## 2019-01-27 NOTE — GCON
DATE OF CONSULTATION:  01/27/2019



REFERRING PHYSICIAN:  Paul Jauregui MD



REASON FOR CONSULTATION:  Elevated ammonia, as well as intermittent dysphagia.



HISTORY OF PRESENT ILLNESS:  The patient is a 73-year-old nursing home patient 
with chronic dementia, on supportive care, who was admitted to the hospital on 
01/22/19 with worsening alteration of mental status.  He did have a history of 
intermittent hyperammonemia with prior hospitalizations at [facility] for same.  He has had neurologic workup without etiology found.  He 
was admitted on this occasion with an ammonia of 134.  He was also noted to 
have a urinary tract infection with E coli and has since been treated with 
Xifaxan for his hyperammonemia.  He has had lessening of his mental confusion 
and is apparently back to his baseline.  His most recent ammonia level was 24 
today, his LFTs were normal on admission and previous admissions also confirm 
normal LFTs.  His ProTime on this admission was 16.8 with an INR 1.34.  His 
albumin was 2.5 and total protein 5.1.  Abdominal CT scan performed on 10/18/18 
revealed normal appearing liver texture and size with benign-appearing small 
cysts and normal hepatic and portal venous system and flow.  His kidneys were 
also noted to be normal.  He did have a contracted gallbladder, albeit his bile 
duct was normal.  The patient is confused and somewhat lethargic today, though 
this appears to be his baseline per the nurses.  He did have an episode 
yesterday were he choked on food and felt like he had food stuck in his 
esophagus and copious production of saliva for several hours, which then 
cleared on its own.  He is able to tolerate a mechanical soft diet at the 
present time.



MEDICATIONS:  Prior to admission included aspirin 81 mg daily, lactulose t.i.d.
, MiraLAX p.r.n.



ALLERGIES:  He has no known drug allergies.



PAST MEDICAL HISTORY:  Significant for dementia, essential tremor, and 
intermittent hyperammonemia.



PAST SURGICAL HISTORY:  Unremarkable.



SOCIAL HISTORY:  Resides at [facility].  He is comfort care.  His son is 
power of .  I did attempt to contact the son today, albeit he does not 
respond to the call and his phone was not set up for voicemail.



FAMILY HISTORY:  Unremarkable for dementia or liver diseases.



REVIEW OF SYSTEMS:  Were unobtainable as the patient's altered mental status 
and nonverbal status.



EXAMINATION:  VITAL SIGNS:  Today, temperature was 36.5, pulse was 80 and 
regular, blood pressure 125/67, respiratory rate was 18, O2 saturation 92% on 
room air.  GENERAL:  Well-developed, well-nourished male in bed, unresponsive 
to questioning.  INTEGUMENT:  Clear.  HEENT:  Head atraumatic, normocephalic.  
Pupils equal, round, reactive to light.  EOMs were intact.  Sclerae nonicteric.
  Nares patent.  Mucous membranes moist.  Dentition fair.  NECK:  Was supple.  
Trachea was midline.  Lymphatics no cervical or axillary adenopathy identified 
or palpated.  PULMONARY:  Lungs clear to percussion and auscultation.  
CARDIOVASCULAR:  Regular rhythm and rate.  Normal S1, S2 without murmur.  
Peripheral pulses strong bilaterally.  No pedal edema.  GASTROINTESTINAL:  
Abdomen mildly protuberant.  No obvious scars.  Positive bowel sounds.  No liver
, spleen tip palpable.  No fluid wave noted.  EXTREMITIES:  Without deformity.  
NEURO:  Patient was arousable to painful stimulation.  Moved all 4 extremities, 
was otherwise somnolent and unresponsive.



LABS:  White count 4.44, hemoglobin 11.7, hematocrit 33.3, platelets 125,000.  
Electrolytes normal.  Anion gap 3, BUN 21, creatinine 0.6, calcium 8.3, total 
bilirubin 0.8, AST 27, ALT 36, ALP 56, ammonia 42 (134 on admission), total 
protein 5.2, albumin 2.5.



IMAGING:  Head CT scan on admission showed no acute intracranial findings.  
Diffuse cerebral atrophy and periventricular and sub cortical low density 
consistent with chronic microvascular ischemic gliosis.



IMPRESSION:  

1.  Transient hyperammonemia, resolved, possibly metabolic in nature secondary 
to recent urinary tract infection.

2.  Chronic dementia-likely microvascular in nature, exacerbated by elevated 
ammonia.

3.  Non-cirrhotic hyperammonemia, no evidence for chronic liver disease or 
chronic renal disease in this gentleman to explain the transient elevated 
pneumonia.

4.  Intermittent dysphagia, possibly related to esophageal stricture versus 
esophageal dysmotility (presbyesophagus).



RECOMMENDATIONS:  

1.  No further GI workup for transient hyperammonemia.  

2.  Need to discuss with family medical directives as the patient's comfort 
measure, it is unclear why he was readmitted to the hospital.

3.  Can perform an esophagogastroduodenoscopy to evaluate this dysphagia while 
patient hospitalized, if I can contact the patient's son, who is apparently is 
medical POA to make medical decisions on the patient's behalf.





Job #:  [id number]

MTDD

## 2019-01-27 NOTE — ASMTLACE
LACE

 

Length of stay for            Answers:  4-6 days                              

current admission                                                             

Acuity / Level of             Answers:  Yes                                   

Care: Did the patient                                                         

have an inpatient                                                             

admission?                                                                    

Comorbidities - select        Answers:  Dementia                              

all that apply                                                                

# of Emergency department     Answers:  3-4                                   

visits in the last 6                                                          

months                                                                        

Score: 13

 

Date Signed:  01/27/2019 03:11 PM

Electronically Signed By:Nargis Dillon RN

## 2019-01-27 NOTE — ASMTDCNOTE
Case Management Discharge

 

Discharge Order Complete?     Answers:  Yes                                   

Patient to Obtain             Answers:  Other                                 

Medications                                                                   

Transportation Arranged       Answers:  Other                         Notes:  wheelchair van

Faxed Final Orders            Answers:  Yes                                   

Family Notified               Answers:  No                            Notes:  attempted

Discharge Comments            

Notes:

Medicallly cleared for transport back to [facility]. Waldemar notified. Final orders via 

 [employer]. RN aware. RN attempted to notify family.

 

Date Signed:  01/27/2019 03:14 PM

Electronically Signed By:Nargis Dillon RN

## 2019-04-09 ENCOUNTER — HOSPITAL ENCOUNTER (INPATIENT)
Dept: HOSPITAL 80 - FED | Age: 74
LOS: 15 days | Discharge: NURSING, REHAB OR LONG-TERM CARE | DRG: 642 | End: 2019-04-24
Attending: INTERNAL MEDICINE | Admitting: INTERNAL MEDICINE
Payer: COMMERCIAL

## 2019-04-09 DIAGNOSIS — T47.3X6A: ICD-10-CM

## 2019-04-09 DIAGNOSIS — F02.80: ICD-10-CM

## 2019-04-09 DIAGNOSIS — E72.20: Primary | ICD-10-CM

## 2019-04-09 DIAGNOSIS — G92: ICD-10-CM

## 2019-04-09 DIAGNOSIS — G25.0: ICD-10-CM

## 2019-04-09 DIAGNOSIS — G31.83: ICD-10-CM

## 2019-04-09 DIAGNOSIS — Z66: ICD-10-CM

## 2019-04-09 DIAGNOSIS — E86.9: ICD-10-CM

## 2019-04-09 DIAGNOSIS — N40.1: ICD-10-CM

## 2019-04-09 DIAGNOSIS — R21: ICD-10-CM

## 2019-04-09 LAB — PLATELET # BLD: 193 10^3/UL (ref 150–400)

## 2019-04-09 RX ADMIN — LACTULOSE SCH: 20 SOLUTION ORAL at 18:09

## 2019-04-09 NOTE — EDPHY
HPI/HX/ROS/PE/MDM


Narrative: 





CHIEF COMPLAINT: AMS, possible UTI 





HPI: 





This patient is an [place]-speaking 73 year old male with past medical history 

including Parkinson's disease, frequent UTIs. He arrives with his family for 

evaluation of altered mental status. He has history of UTIs presenting with 

confusion and weakness. His son notes he has also had a rash on right arm and 

right torso for several days. His son denies any other acute symptoms including 

vomiting, diarrhea.  





HPI obtained primarily from son at bedside. Further HPI difficulty to obtain 

due to patient's altered mental status. 





REVIEW OF SYSTEMS:


A comprehensive 10 system review of systems is otherwise negative aside from 

elements mentioned in the history of present illness and medical decision 

making.





PMH: Parkinson's disease with dementia. Frequent UTIs. S/p TURP. 





SOCIAL HISTORY: Son at bedside. . Retired. Lives in [place]. 





PHYSICAL EXAM:


General:Patient is alert, in no acute distress. He appears confused and is 

speaking [place] with his son. 


ENT:Mild scleral icterus.  ENT inspection normal.


Neck: Normal inspection.  Full range of motion.


Respiratory:No respiratory distress.  Breath sounds normal bilaterally.


Cardiovascular: Regular rate and rhythm.  Strong peripheral pulses.  Normal cap 

refill.


Abdomen:The abdomen is nontender to palpation. There are no peritoneal signs. 

There are normal bowel sounds.


Back: Normal to inspection.  No tenderness to palpation.


Skin: Normal color. Red maculopapular rash extends over entire right chest and 

abdomen as well as the entire right arm. The rash appears to be limited to the 

right.  Warm and dry.


Extremities: Normal appearance.  Full range of motion.


Neuro: No focal deficits.





ED Course: 





74 y/o male with history of Parkinson's and Parkinson's dementia presents with 

altered mental status and some agitation which his son states is consistent 

with prior UTIs. Additionally, he has a red maculopapular rash over the entire 

right arm and right-sided chest and abdomen.  Plan for EKG, labs including CBC, 

chemistries, LFTs, UA, ammonia level, POC troponin. Administered 500mL IV NS. 





12:24 Patient has been quite agitated and was attempting to remove his IV. 

Staff administered 0.5mg IV Ativan with PA approval. 





13:02 EKG was ordered and interpreted by myself.  Please see [employer] system 

for official reading.





Troponin negative. 





UA shows UTI. Discussed results with patient's son. Patient remains quite 

altered. Plan to administer 1g IV Rocephin. Plan to admit for UTI. 





13:39 Spoke with hospitalist service. Dr. Nevarez accepts admission for UTI, AMS. 





- Data Points


Laboratory Results: 


 Laboratory Results





 04/09/19 12:07 





 04/09/19 12:07 





 











  04/09/19 04/09/19 04/09/19





  13:15 12:09 12:07


 


WBC      





    


 


RBC      





    


 


Hgb      





    


 


Hct      





    


 


MCV      





    


 


MCH      





    


 


MCHC      





    


 


RDW      





    


 


Plt Count      





    


 


MPV      





    


 


Neut % (Auto)      





    


 


Lymph % (Auto)      





    


 


Mono % (Auto)      





    


 


Eos % (Auto)      





    


 


Baso % (Auto)      





    


 


Nucleat RBC Rel Count      





    


 


Absolute Neuts (auto)      





    


 


Absolute Lymphs (auto)      





    


 


Absolute Monos (auto)      





    


 


Absolute Eos (auto)      





    


 


Absolute Basos (auto)      





    


 


Absolute Nucleated RBC      





    


 


Immature Gran %      





    


 


Immature Gran #      





    


 


Sodium      139 mEq/L mEq/L





     (135-145) 


 


Potassium      4.4 mEq/L mEq/L





     (3.5-5.2) 


 


Chloride      109 mEq/L mEq/L





     () 


 


Carbon Dioxide      22 mEq/l mEq/l





     (22-31) 


 


Anion Gap      8 mEq/L mEq/L





     (6-14) 


 


BUN      38 mg/dL H mg/dL





     (7-23) 


 


Creatinine      0.6 mg/dL L mg/dL





     (0.7-1.3) 


 


Estimated GFR      > 60 





    


 


Glucose      89 mg/dL mg/dL





     () 


 


Calcium      9.0 mg/dL mg/dL





     (8.5-10.4) 


 


Total Bilirubin      0.7 mg/dL mg/dL





     (0.1-1.4) 


 


Conjugated Bilirubin      0.2 mg/dL mg/dL





     (0.0-0.5) 


 


Unconjugated Bilirubin      0.5 mg/dL mg/dL





     (0.0-1.1) 


 


AST      32 IU/L IU/L





     (17-59) 


 


ALT      43 IU/L IU/L





     (21-72) 


 


Alkaline Phosphatase      84 IU/L IU/L





     () 


 


POC Troponin I    0.00 ng/mL ng/mL  





    (0.00-0.08)  


 


Total Protein      6.6 g/dL g/dL





     (6.3-8.2) 


 


Albumin      3.4 g/dL L g/dL





     (3.5-5.0) 


 


Urine Color  YELLOW     





    


 


Urine Appearance  CLEAR     





    


 


Urine pH  6.0     





   (5.0-7.5)   


 


Ur Specific Gravity  1.018     





   (1.002-1.030)   


 


Urine Protein  NEGATIVE     





   (NEGATIVE)   


 


Urine Ketones  NEGATIVE     





   (NEGATIVE)   


 


Urine Blood  NEGATIVE     





   (NEGATIVE)   


 


Urine Nitrate  POSITIVE  H     





   (NEGATIVE)   


 


Urine Bilirubin  NEGATIVE     





   (NEGATIVE)   


 


Urine Urobilinogen  NEGATIVE EU EU    





   (0.2-1.0)   


 


Ur Leukocyte Esterase  1+  H     





   (NEGATIVE)   


 


Urine RBC  1-3 /hpf /hpf    





   (0-3)   


 


Urine WBC  25-50 /hpf H /hpf    





   (0-3)   


 


Ur Epithelial Cells  NONE SEEN /lpf /lpf    





   (NONE-1+)   


 


Urine Bacteria  2+ /hpf H /hpf    





   (NONE SEEN)   


 


Urine Mucus  TRACE /lpf /lpf    





   (NONE-1+)   


 


Urine Glucose  NEGATIVE     





   (NEGATIVE)   














  04/09/19





  12:07


 


WBC  5.50 10^3/uL 10^3/uL





   (3.80-9.50) 


 


RBC  4.23 10^6/uL L 10^6/uL





   (4.40-6.38) 


 


Hgb  13.0 g/dL L g/dL





   (13.7-17.5) 


 


Hct  38.6 % L %





   (40.0-51.0) 


 


MCV  91.3 fL fL





   (81.5-99.8) 


 


MCH  30.7 pg pg





   (27.9-34.1) 


 


MCHC  33.7 g/dL g/dL





   (32.4-36.7) 


 


RDW  13.9 % %





   (11.5-15.2) 


 


Plt Count  193 10^3/uL 10^3/uL





   (150-400) 


 


MPV  9.3 fL fL





   (8.7-11.7) 


 


Neut % (Auto)  56.3 % %





   (39.3-74.2) 


 


Lymph % (Auto)  23.5 % %





   (15.0-45.0) 


 


Mono % (Auto)  8.0 % %





   (4.5-13.0) 


 


Eos % (Auto)  11.1 % H %





   (0.6-7.6) 


 


Baso % (Auto)  0.9 % %





   (0.3-1.7) 


 


Nucleat RBC Rel Count  0.0 % %





   (0.0-0.2) 


 


Absolute Neuts (auto)  3.10 10^3/uL 10^3/uL





   (1.70-6.50) 


 


Absolute Lymphs (auto)  1.29 10^3/uL 10^3/uL





   (1.00-3.00) 


 


Absolute Monos (auto)  0.44 10^3/uL 10^3/uL





   (0.30-0.80) 


 


Absolute Eos (auto)  0.61 10^3/uL H 10^3/uL





   (0.03-0.40) 


 


Absolute Basos (auto)  0.05 10^3/uL 10^3/uL





   (0.02-0.10) 


 


Absolute Nucleated RBC  0.00 10^3/uL 10^3/uL





   (0-0.01) 


 


Immature Gran %  0.2 % %





   (0.0-1.1) 


 


Immature Gran #  0.01 10^3/uL 10^3/uL





   (0.00-0.10) 


 


Sodium  





  


 


Potassium  





  


 


Chloride  





  


 


Carbon Dioxide  





  


 


Anion Gap  





  


 


BUN  





  


 


Creatinine  





  


 


Estimated GFR  





  


 


Glucose  





  


 


Calcium  





  


 


Total Bilirubin  





  


 


Conjugated Bilirubin  





  


 


Unconjugated Bilirubin  





  


 


AST  





  


 


ALT  





  


 


Alkaline Phosphatase  





  


 


POC Troponin I  





  


 


Total Protein  





  


 


Albumin  





  


 


Urine Color  





  


 


Urine Appearance  





  


 


Urine pH  





  


 


Ur Specific Gravity  





  


 


Urine Protein  





  


 


Urine Ketones  





  


 


Urine Blood  





  


 


Urine Nitrate  





  


 


Urine Bilirubin  





  


 


Urine Urobilinogen  





  


 


Ur Leukocyte Esterase  





  


 


Urine RBC  





  


 


Urine WBC  





  


 


Ur Epithelial Cells  





  


 


Urine Bacteria  





  


 


Urine Mucus  





  


 


Urine Glucose  





  











Medications Given: 


 





Ceftriaxone Sodium/Dextrose (Rocephin 1 Gm (Premix))  50 mls @ 100 mls/hr IV 

EDNOW ONE


   PRN Reason: Protocol


   Stop: 04/09/19 14:04


   Last Admin: 04/09/19 13:44 Dose:  50 mls





Discontinued Medications





Sodium Chloride (Ns)  500 mls @ 0 mls/hr IV EDNOW ONE; Wide Open


   PRN Reason: Protocol


   Stop: 04/09/19 12:03


   Last Admin: 04/09/19 12:23 Dose:  500 mls


Lorazepam (Ativan Injection)  0.5 mg IVP EDNOW ONE


   Stop: 04/09/19 12:25


   Last Admin: 04/09/19 12:27 Dose:  0.5 mg





Point of Care Test Results: 


 Chemistry











  04/09/19





  12:09


 


POC Troponin I  0.00 ng/mL ng/mL





   (0.00-0.08) 














General


Time Seen by Provider: 04/09/19 12:01


Initial Vital Signs: 


 Initial Vital Signs











Temperature (C)  36.6 C   04/09/19 11:32


 


Heart Rate  65   04/09/19 11:32


 


Respiratory Rate  16   04/09/19 11:32


 


Blood Pressure  108/66   04/09/19 11:32


 


O2 Sat (%)  96   04/09/19 11:32








 











O2 Delivery Mode               Room Air














Allergies/Adverse Reactions: 


 





No Known Allergies Allergy (Verified 04/09/19 11:31)


 











Departure





- Departure


Disposition: [facility] Inpatient Acute


Clinical Impression: 


Urinary tract infection


Qualifiers:


 Urinary tract infection type: acute cystitis Hematuria presence: without 

hematuria Qualified Code(s): N30.00 - Acute cystitis without hematuria





Condition: Fair


Referrals: 


Abbey Zhu MD [Primary Care Provider] - As per Instructions


Report Scribed for: Kwame Mandujano


Report Scribed by: Shirley Rosa


Date of Report: 04/09/19


Time of Report: 12:12


Physician Review and Approval Statement: Portions of this note were transcribed 

by an ED scribe.  I personally performed the history, physical exam, and 

medical decision making; and confirm the accuracy of the information in the 

transcribed note.

## 2019-04-09 NOTE — CPEKG
Test Reason : OPEN

Blood Pressure : ***/*** mmHG

Vent. Rate : 069 BPM     Atrial Rate : 070 BPM

   P-R Int : 187 ms          QRS Dur : 089 ms

    QT Int : 416 ms       P-R-T Axes : 017 -09 -03 degrees

   QTc Int : 446 ms

 

Sinus rhythm

Abnormal R-wave progression, early transition

Borderline T abnormalities, anterior leads

 

Confirmed by Kwame Mandujano ([id number]) on 4/9/2019 3:18:45 PM

 

Referred By: Kwame Mandujano           Confirmed By:Kwame Mandujano

## 2019-04-09 NOTE — GHP
[f rep st]



                                                            HISTORY AND PHYSICAL





DATE OF ADMISSION:  04/09/2019



CHIEF COMPLAINT:  Altered mental status.



HPI:  The patient is a 73-year-old with a past medical history significant for recurrent UTIs, essent
ial tremor, BPH and elevated ammonia level, which responds to lactulose.  He has had intermittent UTI
s over the past several months and was most recently discharged to [facility] and transferred to [facility] for long-term care when he was not eating and declining.  A couple weeks ago on March 23, the s
on took him out of [facility] and took him in back home because the patient wanted to c
ome home and because he was having difficulty paying for [facility].  He actually did quite well since
 being home.  He was eating.  He was doing pretty well.  However, 4 days ago, the son noted a rash on
 his torso that was just on the right side, extending from his upper chest and axilla down to his ing
uinal area.  Two days ago, he had increasing weakness and increasing confusion to a point where he is
 unable to care for him at home and brought him into the ER for further evaluation and treatment.  Th
e son has not noted any fevers, chills, nausea, vomiting, changes in BMs or urinary symptoms.  Of not
e, the patient received 0.5 mg of Ativan, was obtunded and unable to provide any history.  The son pr
ovided all the history, as well as chart review.



REVIEW OF SYSTEMS:  Unobtainable.



PAST MEDICAL HISTORY:  Essential tremor, hyperammonemia that does respond to lactulose when he become
s confused, dementia, history of UTIs, history of BPH.



FAMILY HISTORY:  Reviewed.



SOCIAL HISTORY:  He is  from his wife, who lives in [place], he currently lives with his son a
nd his son's roommate.  They do have a caregiver who stays with him 5 days a week during the day.  Hi
s son or his roommate is with him at night.  He generally is not alone except for an hour at a time a
t the most.  He was recently at [facility] in long-term care.  However, due to financial cost and the 
patient wanting to be home, he returned home on March 23.  No tobacco.  No alcohol use.  The son is t
he medical power of  and the patient is a do not resuscitate.



MEDICATIONS:  Reviewed.  He is typically on aspirin, lactulose and Metamucil at home.



ALLERGIES:  Bactrim.



PHYSICAL EXAMINATION:  VITAL SIGNS:  He has been afebrile, heart rate 67, blood pressure 127/74, resp
irations 14.  He is 97% on room air.  GENERAL:  He is a sedated man in no distress.  He is snoring an
d difficult to arouse.  When I move around in bed, he does grunt and flutter his eyes, but then prommason andrade goes back to sleep.  HEENT:  Head is atraumatic.  No signs of bruising.  Pupils are small, but eq
ual.  Mucous membranes slightly dry.  NECK:  No adenopathy.  HEART:  Regular without murmur.  LUNGS: 
 Clear bilaterally.  ABDOMEN:  Soft without tenderness.  No bladder fullness.  EXTREMITIES:  No edema
.  MUSCULOSKELETAL:  No joint effusions or deformities.  He does seem to be able to move all joints w
ithout signs of discomfort.  NEUROLOGIC:  He is moving all 4 extremities.  He is fairly lethargic and
 sleepy.  SKIN:  He does have a papular rash with an erythematous base, spreading from his midline ba
ck around his torso and down into his inguinal area and extending into his axilla.  There are some cr
usted areas, but no obvious blistering.  Just a few spots noted on the left side on his back.



ASSESSMENT/PLAN:  A 73-year-old with a history of altered mental status and encephalopathy generally 
secondary to elevated ammonia levels and possibly exacerbated by urinary tract infections, presents w
ith 4 days of a rash and increased encephalopathy.

1.  Rash, unclear etiology, possibly contact because it is only on one side of his body; however, can
not rule out shingles which is more disseminated given the multiple dermatomes.  We will check a PCR 
for HSV and zoster.  If positive, given his encephalopathy, would recommend LP and starting to treat 
with acyclovir.  If negative, would treat as contact dermatitis with creams and antihistamines.

2.  Encephalopathy, unclear etiology, recurrent issue for the patient who has had a number of CT scan
s of his brain and MRIs.  At this point, would treat his ammonia level with lactulose, as it has been
 documented that it does respond to lactulose.  We will also treat for potential urinary tract infect
ion with ceftriaxone and plan on LP as above if positive PCR for HSV or zoster.

3.  Pyuria, possible urinary tract infection.  Will start on ceftriaxone.  Unless other obvious cause
 for encephalopathy ensues, we will continue treatment.  We will check postvoid residual and place Fo
susannah if post-void residual greater than 300.

4.  Hyperammonemia causing encephalopathy, responds to lactulose or Xifaxan.  I do not know if he has
 been compliant with his lactulose in the past, but will resume it at this time.

5.  Tremor.  Has seen Dr. Pollock in the past, who feels this is not secondary to Parkinson disease
.

6.  Baseline dementia.



DISPOSITION:  Patient will likely need greater than 2-midnight stay for evaluation of these above iss
ues.  Additionally, given his overall decline and weakness, he will likely need post-hospitalization 
rehab and the patient's son is requesting [facility].



CODE STATUS:  The patient is DNR.





Job #:  [id number]

## 2019-04-10 LAB — PLATELET # BLD: 194 10^3/UL (ref 150–400)

## 2019-04-10 RX ADMIN — LACTULOSE SCH GM: 20 SOLUTION ORAL at 10:31

## 2019-04-10 RX ADMIN — LACTULOSE SCH GM: 20 SOLUTION ORAL at 16:58

## 2019-04-10 RX ADMIN — LACTULOSE SCH GM: 20 SOLUTION ORAL at 01:07

## 2019-04-10 RX ADMIN — TRIAMCINOLONE ACETONIDE SCH APP: 1 OINTMENT TOPICAL at 16:59

## 2019-04-10 RX ADMIN — LACTULOSE SCH: 20 SOLUTION ORAL at 01:18

## 2019-04-10 RX ADMIN — ENOXAPARIN SODIUM SCH MG: 100 INJECTION SUBCUTANEOUS at 10:31

## 2019-04-10 NOTE — HOSPPROG
Hospitalist Progress Note


Assessment/Plan: 





#Acute encephalopathy: Metabolic from ammonia, uti. Very low concern for herpes/

varicella encephalitis.


   - Tx as below


   - Haldol PRN for severe agitation





#Rash: Likely contact dermatitis


   - HSV and VZV PCR of skin scrapings negative (>95% sensitivity)


   - Trial triamcinolone ointment and benadryl cream prn   





#Suspected UTI


   - Continue ceftriaxone 1g IV daily, await urine culture results





#Hyperammonemia: No cirrhosis. Unclear etiology but recurrent issue.


   - Lactulose 20mg TID, consider rifaximin 


   - Recheck ammonia level day after next





#Tremor: Previously seen neurology, not c/w parkinson's. 





#Dementia: Chronic.





VTE ppx: LMWH


Diet: regular


Code: DNR. He reportedly has a MOST form stating comfort care only however he 

continues to be brought to the hospital. Will try to clarify with son his goals 

of care. May need to involve palliative care team.


Dispo: Remain inpatient


Subjective: Sleeping on my evaluation, hard to awaken. Per aid, has been up and 

confused most of morning. Mildly agitated at times but re-directable.


Objective: 


 Vital Signs











Temp Pulse Resp BP Pulse Ox


 


 36.1 C   70   16   120/69   93 


 


 04/10/19 08:28  04/10/19 08:28  04/10/19 08:28  04/10/19 08:28  04/10/19 11:32








 Microbiology











 04/09/19 14:30 Herpes Simplex Virus I (PCR) - Final





 Dermal - Back    Hsv-1 Dna Not Detected





 Herpes Simplex Virus II (PCR) - Final





    Hsv-2 Dna Not Detected





 Varicella-Zoster Group DNA (PCR) - Final





    Vzv Dna Not Detected





 HSV/VZV PCR Additional Information - Final


 


 04/09/19 14:45 Herpes Simplex Virus I (PCR) - Final





 Dermal - Back    Hsv-1 Dna Not Detected





 Herpes Simplex Virus II (PCR) - Final





    Hsv-2 Dna Not Detected





 Varicella-Zoster Group DNA (PCR) - Final





    Vzv Dna Not Detected





 HSV/VZV PCR Additional Information - Final








 Laboratory Results





 04/10/19 04:42 





 04/10/19 04:42 





 











 04/09/19 04/10/19 04/11/19





 05:59 05:59 05:59


 


Intake Total  1000 


 


Output Total  350 


 


Balance  650 














- Physical Exam


Constitutional: no apparent distress, appears nourished, not in pain


Eyes: PERRL, anicteric sclera


Ears, Nose, Mouth, Throat: moist mucous membranes


Cardiovascular: regular rate and rhythym, no murmur, rub, or gallop, No edema


Respiratory: no respiratory distress, no rales or rhonchi, clear to auscultation


Gastrointestinal: normoactive bowel sounds, soft, non-tender abdomen, no 

palpable masses


Genitourinary: no bladder fullness, no bladder tenderness, no renal bruits


Skin: rash (right arm and torso extending over to left torso, erythematous but 

not vesicular)


Musculoskeletal: full muscle strength, no muscle tenderness, normal joint ROM


Neurologic: other (sleeping)


Psychiatric: encephalopathic





ICD10 Worksheet


Patient Problems: 


 Problems











Problem Status Onset


 


Urinary tract infection Acute  


 


Dehydration Acute  


 


Dementia Acute  


 


Encephalopathy acute Acute  


 


Gait instability Acute  


 


Hyperammonemia Acute  


 


Sinusitis Acute

## 2019-04-10 NOTE — PDMN
Medical Necessity


Medical necessity: Pt meets IP criteria per MD & MCG M-300; est los >2 mn for 

eval/tx of possible UTI w/encephalopathy, weakness & rash; admit for further 

workup/monitoring, IV abx & therapies; comorbid advanced age, recurrent UTIs, 

dementia, hyperammonemia; per H&P & order 4/9/19

## 2019-04-11 RX ADMIN — TRIAMCINOLONE ACETONIDE SCH APP: 1 OINTMENT TOPICAL at 09:32

## 2019-04-11 RX ADMIN — TRIAMCINOLONE ACETONIDE SCH APP: 1 OINTMENT TOPICAL at 15:56

## 2019-04-11 RX ADMIN — TRIAMCINOLONE ACETONIDE SCH APP: 1 OINTMENT TOPICAL at 04:23

## 2019-04-11 RX ADMIN — LACTULOSE SCH GM: 20 SOLUTION ORAL at 09:27

## 2019-04-11 RX ADMIN — LACTULOSE SCH GM: 20 SOLUTION ORAL at 15:55

## 2019-04-11 RX ADMIN — TRIAMCINOLONE ACETONIDE SCH APP: 1 OINTMENT TOPICAL at 23:14

## 2019-04-11 RX ADMIN — ENOXAPARIN SODIUM SCH MG: 100 INJECTION SUBCUTANEOUS at 09:27

## 2019-04-11 RX ADMIN — LACTULOSE SCH: 20 SOLUTION ORAL at 01:00

## 2019-04-11 RX ADMIN — LACTULOSE SCH GM: 20 SOLUTION ORAL at 23:14

## 2019-04-11 RX ADMIN — LACTULOSE SCH: 20 SOLUTION ORAL at 23:22

## 2019-04-11 NOTE — ASMTCMCOM
CM Note

 

CM Note                       

Notes:

Pt is 72 yo M presented with UTI/Encephalopathy. Pt was last discharged to SNF then to the [facility] 

for AL and then went home with his son. MD spoke with son and he reports that he would like for him 


to eventually return back home but is understanding if he needs to go to SNF after this 

hospitalization. At this time PT and OT are recommending SNF. Family interested in [facility]. CM 

started referral. 



Plan: likely [facility] pending acceptance.

 

Date Signed:  04/11/2019 03:01 PM

Electronically Signed By:LAURA Watts

## 2019-04-11 NOTE — HOSPPROG
Hospitalist Progress Note


Assessment/Plan: 





74yo M with dementia with several recent admissions for encephalopathy in 

setting of hyperammonemia and UTI presents with encephalopathy. 





#Acute metabolic encephalopathy: 2/2 ammonia, possible uti. He is improving but 

unclear if back to baseline. 


   - Tx as below


   - Haldol PRN for severe agitation





#Rash: Likely contact dermatitis


   - HSV and VZV PCR of skin scrapings negative (>95% sensitivity)


   - Trial triamcinolone ointment and benadryl cream prn   





#Suspected UTI


   - Continue ceftriaxone 1g IV daily, awaiting urine culture results





#Hyperammonemia: No cirrhosis. Unclear etiology but recurrent issue. GI 

consulted last admit but work up deferred given his 


   - Lactulose 20mg TID


   - Recheck ammonia level tomorrow





#Tremor: Previously seen neurology, not c/w parkinson's. 





#Dementia: Chronic. He was previously living at the [facility] term care 

[facility] but could not afford any longer so now living at home with son. PT and 

OT are recommending SNF/long term care. D/w patient's son - he is amenable to 

SNF ([facility]).





#Goals of care: MOST form from 1/2019 reports comfort measures only. I 

discussed this with son. He reports that his father would want to come to the 

hospital for treatment/management of acute medical issues but did confirm DNR/

DNI status. Palliative care consult placed for outpatient services.





VTE ppx: LMWH


Diet: regular


Code: DNR


Dispo: Remain inpatient


Subjective: Much more awake this morning but still confused. Per RN, calm 

overnight. He is asking for chocolate. Denies pain.


Objective: 


 Vital Signs











Temp Pulse Resp BP Pulse Ox


 


 36.7 C   65   16   122/77 H  94 


 


 04/11/19 07:12  04/11/19 07:12  04/11/19 07:12  04/11/19 07:12  04/11/19 07:12








 Microbiology











 04/09/19 14:30 Herpes Simplex Virus I (PCR) - Final





 Dermal - Back    Hsv-1 Dna Not Detected





 Herpes Simplex Virus II (PCR) - Final





    Hsv-2 Dna Not Detected





 Varicella-Zoster Group DNA (PCR) - Final





    Vzv Dna Not Detected





 HSV/VZV PCR Additional Information - Final


 


 04/09/19 14:45 Herpes Simplex Virus I (PCR) - Final





 Dermal - Back    Hsv-1 Dna Not Detected





 Herpes Simplex Virus II (PCR) - Final





    Hsv-2 Dna Not Detected





 Varicella-Zoster Group DNA (PCR) - Final





    Vzv Dna Not Detected





 HSV/VZV PCR Additional Information - Final








 Laboratory Results





 04/10/19 04:42 





 04/10/19 04:42 





 











 04/10/19 04/11/19 04/12/19





 05:59 05:59 05:59


 


Intake Total 1000 300 


 


Output Total 350 600 


 


Balance 650 -300 














- Physical Exam


Constitutional: no apparent distress, appears nourished, not in pain


Eyes: PERRL, anicteric sclera, EOMI


Ears, Nose, Mouth, Throat: moist mucous membranes, hearing normal, ears appear 

normal, no oral mucosal ulcers


Cardiovascular: regular rate and rhythym, no murmur, rub, or gallop


Respiratory: no respiratory distress, no rales or rhonchi, clear to auscultation


Gastrointestinal: normoactive bowel sounds, soft, non-tender abdomen, no 

palpable masses


Genitourinary: no bladder fullness, no bladder tenderness, no renal bruits


Skin: other (improving macular rash on right arm and torso)


Musculoskeletal: full muscle strength


Neurologic: other (alert, oriented to self)


Psychiatric: encephalopathic





ICD10 Worksheet


Patient Problems: 


 Problems











Problem Status Onset


 


Dehydration Acute  


 


Urinary tract infection Acute  


 


Sinusitis Acute  


 


Dementia Acute  


 


Gait instability Acute  


 


Encephalopathy acute Acute  


 


Hyperammonemia Acute

## 2019-04-12 RX ADMIN — TRIAMCINOLONE ACETONIDE SCH APP: 1 OINTMENT TOPICAL at 16:53

## 2019-04-12 RX ADMIN — LACTULOSE SCH GM: 20 SOLUTION ORAL at 16:52

## 2019-04-12 RX ADMIN — TRIAMCINOLONE ACETONIDE SCH: 1 OINTMENT TOPICAL at 22:16

## 2019-04-12 RX ADMIN — HALOPERIDOL LACTATE PRN MG: 5 INJECTION, SOLUTION INTRAMUSCULAR at 22:53

## 2019-04-12 RX ADMIN — HALOPERIDOL LACTATE PRN MG: 5 INJECTION, SOLUTION INTRAMUSCULAR at 02:24

## 2019-04-12 RX ADMIN — TRIAMCINOLONE ACETONIDE SCH APP: 1 OINTMENT TOPICAL at 10:29

## 2019-04-12 RX ADMIN — LACTULOSE SCH GM: 20 SOLUTION ORAL at 10:30

## 2019-04-12 RX ADMIN — HALOPERIDOL LACTATE PRN MG: 5 INJECTION, SOLUTION INTRAMUSCULAR at 22:14

## 2019-04-12 RX ADMIN — LACTULOSE SCH: 20 SOLUTION ORAL at 22:16

## 2019-04-12 RX ADMIN — ENOXAPARIN SODIUM SCH MG: 100 INJECTION SUBCUTANEOUS at 10:29

## 2019-04-12 NOTE — HOSPPROG
Hospitalist Progress Note


Assessment/Plan: 





74yo M with dementia with several recent admissions for encephalopathy in 

setting of hyperammonemia and UTI presents with encephalopathy. 





#Acute metabolic encephalopathy: 2/2 ammonia, less likely uti as below. He is 

improving but continues to wax and want and unclear if back to baseline. 

Continues to get agitated at night. Continue to minimize disruption of sleep/

wake cycles and work on orientation. 





#Rash: Likely contact dermatitis. Improving with triamcinolone ointment and 

benadryl cream prn. HSV/VZV testing negatvie





# bacturia: low colony counts of several bacteria. Has been on CTX and will 

have him complete a short course of this, but suspect this represents 

colonization rather than infection





#Hyperammonemia: No cirrhosis. Unclear etiology but recurrent issue. 


   - continue Lactulose 20mg TID





#Tremor: Previously seen neurology, not c/w parkinson's. 





#Dementia: Chronic. He was previously living at the [facility] term care 

facility but could not afford any longer so now living at home with son. PT and 

OT are recommending SNF/long term care, reportedly son amenable to that but 

have been unable to reach them 





#Goals of care: MOST form from 1/2019 reports comfort measures only but 

apparently son was unsure of that, is DNR. Palliative care consult placed for 

outpatient services.





VTE ppx: LMWH


Diet: regular


Code: DNR


Dispo:inpatient


Patient new to my care. Old records reviewed and summarized as above. Care plan 

reviewed with CM, palliative care 


Subjective: patient somnolent this am, did receive haldol in the evening that 

apparently was still affecting him, remaimns confused when awake but less so


Objective: 


 Vital Signs











Temp Pulse Resp BP Pulse Ox


 


 36.8 C   62   16   118/71   92 


 


 04/12/19 08:00  04/12/19 08:00  04/12/19 08:00  04/12/19 08:00  04/12/19 08:00








 Laboratory Results





 04/10/19 04:42 





 04/12/19 05:00 





 











 04/11/19 04/12/19 04/13/19





 05:59 05:59 05:59


 


Intake Total 300 800 


 


Output Total 600  


 


Balance -300 800 








somnoelnt, minimally arousable


anicteric


mmm


rrr no mrg


cta b


soft nt nd


no cce


warm dry well perfused


encephalopathic





ICD10 Worksheet


Patient Problems: 


 Problems











Problem Status Onset


 


Dehydration Acute  


 


Urinary tract infection Acute  


 


Sinusitis Acute  


 


Dementia Acute  


 


Gait instability Acute  


 


Encephalopathy acute Acute  


 


Hyperammonemia Acute

## 2019-04-12 NOTE — ASMTCMCOM
CM Note

 

CM Note                       

Notes:

Attempted to call pt's son Jorje [phone] and unable to reach or leave voicemail. Contacted 

Radha pt's caregiver and she will send a text to Jorje re our need to talk with him, and provided 


CM number. One question is whether pt is at his baseline with his dementia. Palliative Care did 

receive a call back from Jorje and a PC meeting is scheduled for Monday at 3:00pm. [facility] is 

still looking at pt - Waldemar was here today and questioning whether there will be enough to skill 

pt for SNF admission. 



D/C plan: TBD, likely [facility]

 

Date Signed:  04/12/2019 03:56 PM

Electronically Signed By:ANGELA Martin

## 2019-04-13 RX ADMIN — LACTULOSE SCH GM: 20 SOLUTION ORAL at 15:58

## 2019-04-13 RX ADMIN — ENOXAPARIN SODIUM SCH MG: 100 INJECTION SUBCUTANEOUS at 10:15

## 2019-04-13 RX ADMIN — TRIAMCINOLONE ACETONIDE SCH APP: 1 OINTMENT TOPICAL at 16:01

## 2019-04-13 RX ADMIN — HALOPERIDOL LACTATE PRN MG: 5 INJECTION, SOLUTION INTRAMUSCULAR at 04:21

## 2019-04-13 RX ADMIN — TRIAMCINOLONE ACETONIDE SCH APP: 1 OINTMENT TOPICAL at 10:16

## 2019-04-13 RX ADMIN — LACTULOSE SCH GM: 20 SOLUTION ORAL at 10:15

## 2019-04-13 RX ADMIN — ACETAMINOPHEN PRN MG: 325 TABLET ORAL at 15:58

## 2019-04-13 RX ADMIN — LACTULOSE SCH: 20 SOLUTION ORAL at 10:19

## 2019-04-13 NOTE — HOSPPROG
Hospitalist Progress Note


Assessment/Plan: 





74yo M with dementia with several recent admissions for encephalopathy in 

setting of hyperammonemia and UTI presents with encephalopathy. 





#Acute metabolic encephalopathy: 2/2 ammonia elevation with underlying 

dementia. He is improving but continues to wax and want and unclear if back to 

baseline. Continues to get agitated at night and has been getting haldol which 

is leading to significant sedation--will change to zyprexa at night. Continue 

to minimize disruption of sleep/wake cycles and work on orientation. 





#Rash: Likely contact dermatitis. Improving with triamcinolone ointment and 

benadryl cream prn. HSV/VZV testing negatvie





# bacturia: low colony counts of several bacteria. Has been on CTX and will dc 

at this point given no clear e/o infection





#Hyperammonemia: No cirrhosis. Unclear etiology but recurrent issue. Ammonia 

has been trending down


   - continue Lactulose 20mg TID





#Tremor: Previously seen neurology, not c/w parkinson's. 





#Dementia: Chronic. He was previously living at the [facility] long term care 

facility but could not afford any longer so now living at home with son. PT and 

OT are recommending SNF/long term care, reportedly son amenable to that but 

have been unable to reach them 





#Goals of care: MOST form from 1/2019 reports comfort measures only but 

apparently son was unsure of that, is DNR. Palliative care consult placed for 

outpatient services.





VTE ppx: LMWH


Diet: regular


Code: DNR


Dispo:inpatient





Subjective: no significant overnight events, patient somnolent again this am 

but later sitting up and eating


Objective: 


 Vital Signs











Temp Pulse Resp BP Pulse Ox


 


 37.2 C   67   12   116/69   92 


 


 04/13/19 09:45  04/13/19 09:45  04/13/19 09:45  04/13/19 09:45  04/13/19 09:45








 Microbiology











 04/10/19 13:20 Urine Culture - Final





 Urine,Clean Catch    Gram Negative [place]





    Enterococcus Species





    Two [place] Types








 Laboratory Results





 04/10/19 04:42 





 04/12/19 05:00 





 











 04/12/19 04/13/19 04/14/19





 05:59 05:59 05:59


 


Intake Total 800 1220 


 


Balance 800 1220 








somnoelnt, minimally arousable


anicteric


mmm


rrr no mrg


cta b


soft nt nd


no cce


warm dry well perfused


encephalopathic





ICD10 Worksheet


Patient Problems: 


 Problems











Problem Status Onset


 


Urinary tract infection Acute  


 


Dehydration Acute  


 


Dementia Acute  


 


Encephalopathy acute Acute  


 


Gait instability Acute  


 


Hyperammonemia Acute  


 


Sinusitis Acute

## 2019-04-14 RX ADMIN — TRIAMCINOLONE ACETONIDE SCH: 1 OINTMENT TOPICAL at 00:31

## 2019-04-14 RX ADMIN — LACTULOSE SCH GM: 20 SOLUTION ORAL at 09:44

## 2019-04-14 RX ADMIN — TRIAMCINOLONE ACETONIDE SCH: 1 OINTMENT TOPICAL at 09:50

## 2019-04-14 RX ADMIN — LACTULOSE SCH GM: 20 SOLUTION ORAL at 21:03

## 2019-04-14 RX ADMIN — TRIAMCINOLONE ACETONIDE SCH APP: 1 OINTMENT TOPICAL at 23:55

## 2019-04-14 RX ADMIN — ENOXAPARIN SODIUM SCH MG: 100 INJECTION SUBCUTANEOUS at 09:46

## 2019-04-14 RX ADMIN — TRIAMCINOLONE ACETONIDE SCH APP: 1 OINTMENT TOPICAL at 16:24

## 2019-04-14 RX ADMIN — LACTULOSE SCH: 20 SOLUTION ORAL at 00:32

## 2019-04-14 RX ADMIN — LACTULOSE SCH GM: 20 SOLUTION ORAL at 16:24

## 2019-04-14 NOTE — ASMTCMCOM
CM Note

 

CM Note                       

Notes:

Chart reviewed. Patient to have palliative care consult. Son has not returned calls to CM .[facility] has not yet accepted patient,. Plan of care goals unclear at this time.



Plan: TBD

 

Date Signed:  04/14/2019 12:06 PM

Electronically Signed By:Nargis Dillon RN

## 2019-04-14 NOTE — HOSPPROG
Hospitalist Progress Note


Assessment/Plan: 





72yo M with dementia with several recent admissions for encephalopathy in 

setting of hyperammonemia and UTI presents with encephalopathy. 





#Acute toxic metabolic encephalopathy: 2/2 ammonia elevation with underlying 

dementia. Overall improving but still quite confused and somnolent 

intermittently--less so since discontinuing haldol and switching to olanzapine. 

Will decrease olanzapine to 2.5mg at hs with additional 2.5 prn if needed. 

Remains very impulsive from time to time. 





#Rash: Likely contact dermatitis. Improving with triamcinolone ointment and 

benadryl cream prn. HSV/VZV testing negatvie





# bacturia: low colony counts of several bacteria. s/p several days of ctx 

which was discontinued given lack of evidence of UTI





#Hyperammonemia: No cirrhosis. Unclear etiology but recurrent issue. Ammonia 

has been trending down


   - continue Lactulose 20mg TID





#Tremor: Previously seen neurology, not c/w parkinson's. 





#Dementia: Chronic. He was previously living at the [facility] long term care 

facility but could not afford any longer so now living at home with son. PT and 

OT are recommending SNF/long term care, reportedly son amenable to that but 

have been unable to reach them 





#Goals of care: MOST form from 1/2019 reports comfort measures only but 

apparently son was unsure of that, is DNR. Palliative care consult placed for 

outpatient services.





VTE ppx: LMWH


Diet: regular


Code: DNR


Dispo:inpatient--patient likely to dc to SNF in coming days, needs to have no 

sitter needs prior to dc





Subjective: no significant overnight events, patient awake and eating sitting 

up but somnolent, answers questions with one or two words, following some 

commands


Objective: 


 Vital Signs











Temp Pulse Resp BP Pulse Ox


 


 36.4 C   62   17   118/69   94 


 


 04/14/19 07:16  04/14/19 07:16  04/14/19 07:16  04/14/19 07:16  04/14/19 07:16








 Laboratory Results





 04/10/19 04:42 





 04/12/19 05:00 





 











 04/13/19 04/14/19 04/15/19





 05:59 05:59 05:59


 


Intake Total 1220 400 


 


Output Total   350


 


Balance 1220 400 -350








awake alert


anicteric


mmm


rrr no mrg


cta b


soft nt nd


no cce


warm dry well perfused


encephalopathic follows some commands





ICD10 Worksheet


Patient Problems: 


 Problems











Problem Status Onset


 


Urinary tract infection Acute  


 


Dehydration Acute  


 


Dementia Acute  


 


Encephalopathy acute Acute  


 


Gait instability Acute  


 


Hyperammonemia Acute  


 


Sinusitis Acute

## 2019-04-15 LAB — PLATELET # BLD: 171 10^3/UL (ref 150–400)

## 2019-04-15 RX ADMIN — LACTULOSE SCH MLS: 10 SOLUTION ORAL at 16:25

## 2019-04-15 RX ADMIN — LACTULOSE SCH: 20 SOLUTION ORAL at 10:02

## 2019-04-15 RX ADMIN — ENOXAPARIN SODIUM SCH MG: 100 INJECTION SUBCUTANEOUS at 10:01

## 2019-04-15 RX ADMIN — TRIAMCINOLONE ACETONIDE SCH APP: 1 OINTMENT TOPICAL at 17:14

## 2019-04-15 RX ADMIN — TRIAMCINOLONE ACETONIDE SCH: 1 OINTMENT TOPICAL at 10:02

## 2019-04-15 RX ADMIN — LACTULOSE SCH: 20 SOLUTION ORAL at 21:19

## 2019-04-15 RX ADMIN — TRIAMCINOLONE ACETONIDE SCH APP: 1 OINTMENT TOPICAL at 23:25

## 2019-04-15 RX ADMIN — LACTULOSE SCH: 20 SOLUTION ORAL at 16:54

## 2019-04-15 NOTE — HOSPPROG
Hospitalist Progress Note


Assessment/Plan: 








Hospitalist night float note





Called by RN regarding patient with decreased responsiveness and snoring.  

Patient received 2 doses I prepped cell late in the morning.


They were not able to arouse the patient although he would stir slightly.  

Blood sugar this morning was noted to be 78.


Half amp of D50 was ordered with improvement of blood sugars to 166 but patient 

remained quite somnolent is snoring.


Additionally patient without any urine output since 7:00 p.m. Straight cath was 

inserted and patient 700 urine retained.


Patient's vital signs otherwise stable he is not tachycardic not hypotensive or 

hypoxic.


Suspect patient with sedation related to Zyprexa which has been placed on hold.


During my exam patient was to remove all extremities slightly.  But he remained 

quite somnolent and snored loudly.  


Rafa consider low-dose I proxy or alternative antipsychotic cor lower dose 

Hiprex a once patient's mentation clears.


Objective: 


 Vital Signs











Temp Pulse Resp BP Pulse Ox


 


 36.8 C   63   14   108/59 L  93 


 


 04/15/19 04:37  04/15/19 04:37  04/15/19 04:37  04/15/19 04:37  04/15/19 04:37








 Laboratory Results





 04/15/19 04:30 





 04/15/19 04:30 





 











 04/14/19 04/15/19 04/16/19





 05:59 05:59 05:59


 


Intake Total 400 1200 


 


Output Total  1450 


 


Balance 400 -250 














ICD10 Worksheet


Patient Problems: 


 Problems











Problem Status Onset


 


Urinary tract infection Acute  


 


Dehydration Acute  


 


Dementia Acute  


 


Encephalopathy acute Acute  


 


Gait instability Acute  


 


Hyperammonemia Acute  


 


Sinusitis Acute

## 2019-04-15 NOTE — HOSPPROG
Hospitalist Progress Note


Assessment/Plan: 





72yo M with dementia with several recent admissions for encephalopathy in 

setting of hyperammonemia and UTI presents with encephalopathy. 





#Acute toxic metabolic encephalopathy: 2/2 ammonia elevation with underlying 

dementia. he received multiple doses of zyprexa overnight and today is 

somnolent. I rechecked his ammonia today as he has not had a BM in 3 days and 

his ammonia is 119. Per the son his highest ammonia ever was 120 and at that 

time the patient was completely somnolent and not arousable. He cannot take 

lactulose orally today or any oral medications


-lactulose enema


-hold sedating medications. 





#Rash: Likely contact dermatitis. Improving with triamcinolone ointment and 

benadryl cream prn. HSV/VZV testing negatvie





# bacturia: low colony counts of several bacteria. s/p several days of ctx 

which was discontinued given lack of evidence of UTI





#Hyperammonemia: No cirrhosis. Unclear etiology but recurrent issue. Ammonia 

has been trending down


   - start lactulose enema today. 


   -re[eat ammonia tomorrow. 





#Tremor: Previously seen neurology, not c/w parkinson's. 





#Dementia: Chronic. He was previously living at the [facility] long term care 

facility but could not afford any longer so now living at home with son. PT and 

OT are recommending SNF/long term care, reportedly son amenable to that but 

have been unable to reach them 





#Goals of care: MOST form from 1/2019 reports comfort measures only but 

apparently son was unsure of that, is DNR. Palliative care consult placed for 

outpatient services.





VTE ppx: LMWH


Diet: regular


Code: DNR


Dispo:inpatient--patient likely to dc to SNF in coming days, needs to have no 

sitter needs prior to dc





Subjective: somnolent. responds to stimuli.


Objective: 


 Vital Signs











Temp Pulse Resp BP Pulse Ox


 


 37.3 C   64   10 L  118/72   94 


 


 04/15/19 15:49  04/15/19 15:49  04/15/19 15:49  04/15/19 15:49  04/15/19 15:49








 Microbiology











 04/09/19 19:45 Blood Culture - Final





 Blood 








 Laboratory Results





 04/15/19 04:30 





 04/15/19 04:30 





 











 04/14/19 04/15/19 04/16/19





 05:59 05:59 05:59


 


Intake Total 400 1450 


 


Output Total  1450 700


 


Balance 400 0 -700














- Physical Exam


Constitutional: no apparent distress


Eyes: PERRL


Ears, Nose, Mouth, Throat: moist mucous membranes


Cardiovascular: regular rate and rhythym


Respiratory: no respiratory distress


Gastrointestinal: normoactive bowel sounds


Genitourinary: no bladder fullness


Skin: warm


Musculoskeletal: generalized weakness


Neurologic: other (somnolent. )


Psychiatric: encephalopathic


Lymph, Heme, Immunologic: no cervical LAD





ICD10 Worksheet


Patient Problems: 


 Problems











Problem Status Onset


 


Urinary tract infection Acute  


 


Dehydration Acute  


 


Dementia Acute  


 


Encephalopathy acute Acute  


 


Gait instability Acute  


 


Hyperammonemia Acute  


 


Sinusitis Acute

## 2019-04-16 LAB — PLATELET # BLD: 178 10^3/UL (ref 150–400)

## 2019-04-16 RX ADMIN — LACTULOSE SCH MLS: 10 SOLUTION ORAL at 06:33

## 2019-04-16 RX ADMIN — DEXTROSE MONOHYDRATE AND SODIUM CHLORIDE SCH MLS: 5; .9 INJECTION, SOLUTION INTRAVENOUS at 23:07

## 2019-04-16 RX ADMIN — DEXTROSE MONOHYDRATE AND SODIUM CHLORIDE SCH MLS: 5; .9 INJECTION, SOLUTION INTRAVENOUS at 11:27

## 2019-04-16 RX ADMIN — LACTULOSE SCH: 20 SOLUTION ORAL at 11:21

## 2019-04-16 RX ADMIN — LACTULOSE SCH MLS: 10 SOLUTION ORAL at 00:05

## 2019-04-16 RX ADMIN — LACTULOSE SCH MLS: 10 SOLUTION ORAL at 23:07

## 2019-04-16 RX ADMIN — TRIAMCINOLONE ACETONIDE SCH APP: 1 OINTMENT TOPICAL at 23:15

## 2019-04-16 RX ADMIN — LACTULOSE SCH MLS: 10 SOLUTION ORAL at 13:50

## 2019-04-16 RX ADMIN — LACTULOSE SCH: 20 SOLUTION ORAL at 20:43

## 2019-04-16 RX ADMIN — ENOXAPARIN SODIUM SCH MG: 100 INJECTION SUBCUTANEOUS at 11:19

## 2019-04-16 RX ADMIN — LACTULOSE SCH: 20 SOLUTION ORAL at 17:35

## 2019-04-16 RX ADMIN — TRIAMCINOLONE ACETONIDE SCH APP: 1 OINTMENT TOPICAL at 19:32

## 2019-04-16 RX ADMIN — TRIAMCINOLONE ACETONIDE SCH APP: 1 OINTMENT TOPICAL at 11:23

## 2019-04-16 NOTE — HOSPPROG
Hospitalist Progress Note


Assessment/Plan: 





72yo M with dementia with several recent admissions for encephalopathy in 

setting of hyperammonemia and UTI presents with encephalopathy. 





#Acute toxic metabolic encephalopathy: 2/2 ammonia elevation with underlying 

dementia. was somnolent yesterday and with ammonia of 120 yesterday. I gave 

lactulose enemas yesterday and overnight and now ammonia down to 80 and patient 

more awake. Still very lethargic and does not appear safe to take po 


-cont lactulose enemas


-hold sedating medications. 





#Rash: Likely contact dermatitis. Improving with triamcinolone ointment and 

benadryl cream prn. HSV/VZV testing negatvie





# bacturia: low colony counts of several bacteria. s/p several days of ctx 

which was discontinued given lack of evidence of UTI





#Hyperammonemia: No cirrhosis. Unclear etiology but recurrent issue. Ammonia 

has been trending down


- start lactulose enema today. 


-repeat ammonia tomorrow. 





#Tremor: Previously seen by neurology, not c/w parkinson's. 





#Dementia: Chronic. He was previously living at the [facility] long term care 

facility but could not afford any longer so now living at home with son. PT and 

OT are recommending SNF/long term care, reportedly son amenable to that but 

have been unable to reach them 





#Goals of care: MOST form from 1/2019 reports comfort measures only but 

apparently son was unsure of that, is DNR. Palliative care consult placed for 

outpatient services. There is some mention that APS was called as patient not 

getting appropriate care at home. 





VTE ppx: LMWH


Diet: regular


Code: DNR


Dispo:inpatient--patient likely to dc to SNF in coming days, needs to have no 

sitter needs prior to dc. 





Subjective: awake but remains encephalopathic. does not appear uncomfortable.


Objective: 


 Vital Signs











Temp Pulse Resp BP Pulse Ox


 


 37.0 C   72   16   122/68 H  95 


 


 04/16/19 07:59  04/16/19 07:59  04/16/19 07:59  04/16/19 07:59  04/16/19 07:59








 Laboratory Results





 04/16/19 05:10 





 04/16/19 05:10 





 











 04/15/19 04/16/19 04/17/19





 05:59 05:59 05:59


 


Intake Total 1450 600 


 


Output Total 1450 700 


 


Balance 0 -100 














- Physical Exam


Constitutional: no apparent distress


Eyes: PERRL


Ears, Nose, Mouth, Throat: moist mucous membranes


Cardiovascular: regular rate and rhythym


Respiratory: no respiratory distress


Gastrointestinal: normoactive bowel sounds


Genitourinary: no bladder fullness


Skin: warm


Musculoskeletal: generalized weakness


Neurologic: other (able to move all extremities. diffuse weakness. not able to 

assess neuro status. )


Psychiatric: encephalopathic


Lymph, Heme, Immunologic: no cervical LAD





ICD10 Worksheet


Patient Problems: 


 Problems











Problem Status Onset


 


Urinary tract infection Acute  


 


Dehydration Acute  


 


Dementia Acute  


 


Encephalopathy acute Acute  


 


Gait instability Acute  


 


Hyperammonemia Acute  


 


Sinusitis Acute

## 2019-04-17 LAB — PLATELET # BLD: 175 10^3/UL (ref 150–400)

## 2019-04-17 RX ADMIN — ENOXAPARIN SODIUM SCH MG: 100 INJECTION SUBCUTANEOUS at 09:51

## 2019-04-17 RX ADMIN — LACTULOSE SCH: 10 SOLUTION ORAL at 04:34

## 2019-04-17 RX ADMIN — DEXTROSE MONOHYDRATE AND SODIUM CHLORIDE SCH MLS: 5; .9 INJECTION, SOLUTION INTRAVENOUS at 12:50

## 2019-04-17 RX ADMIN — LACTULOSE SCH: 10 SOLUTION ORAL at 01:44

## 2019-04-17 RX ADMIN — LACTULOSE SCH: 20 SOLUTION ORAL at 21:29

## 2019-04-17 RX ADMIN — LACTULOSE SCH GM: 20 SOLUTION ORAL at 12:46

## 2019-04-17 RX ADMIN — LACTULOSE SCH: 10 SOLUTION ORAL at 18:38

## 2019-04-17 RX ADMIN — TRIAMCINOLONE ACETONIDE SCH APP: 1 OINTMENT TOPICAL at 17:58

## 2019-04-17 RX ADMIN — TRIAMCINOLONE ACETONIDE SCH APP: 1 OINTMENT TOPICAL at 21:36

## 2019-04-17 RX ADMIN — TRIAMCINOLONE ACETONIDE SCH APP: 1 OINTMENT TOPICAL at 09:50

## 2019-04-17 NOTE — HOSPPROG
Hospitalist Progress Note


Assessment/Plan: 





74yo M with dementia with several recent admissions for encephalopathy in 

setting of hyperammonemia and UTI presents with encephalopathy. 





#Acute toxic metabolic encephalopathy: 2/2 ammonia elevation with underlying 

dementia. Initially very somnolent with ammonia of 120. started on lactulose 

enemas and has woken up some. Not able to tolerate lactulose enemas at this 

point and is awake enough to hopefully take PO. 


-lactulose 30mg TID, goal 2-3 BMs daily


-hold sedating medications. 





#Rash: Likely contact dermatitis. Improving with triamcinolone ointment and 

benadryl cream prn. HSV/VZV testing negatvie





# bacturia: low colony counts of several bacteria. s/p several days of ctx 

which was discontinued given lack of evidence of UTI





#Hyperammonemia: No cirrhosis. Unclear etiology but recurrent issue. Ammonia 

has been trending down


-lactulose TID 


-repeat ammonia tomorrow. 





#Tremor: Previously seen by neurology, not c/w parkinson's. 





#Dementia: Chronic. He was previously living at the [facility] long term care 

facility but could not afford any longer so now living at home with son. PT and 

OT are recommending SNF/long term care, reportedly son amenable to that but 

have been unable to reach them 





#Goals of care: MOST form from 1/2019 reports comfort measures only but 

apparently son was unsure of that, is DNR. Palliative care consult placed for 

outpatient services. There is some mention that APS was called as patient not 

getting appropriate care at home. 


-palliative care consult today at





VTE ppx: LMWH


Diet: regular


Code: DNR


Dispo:inpatient--patient not able to go to snf, may need to go to long term 

memory unit but sounds like finances are an issue. Dispo pending. 





Subjective: awake, appears comfortable. responds to discussion with Vietnamese.


Objective: 


 Vital Signs











Temp Pulse Resp BP Pulse Ox


 


 37.1 C   71   16   155/91 H  94 


 


 04/17/19 08:00  04/17/19 08:00  04/17/19 08:00  04/17/19 08:00  04/17/19 08:00








 Laboratory Results





 04/17/19 04:20 





 04/17/19 04:20 





 











 04/16/19 04/17/19 04/18/19





 05:59 05:59 05:59


 


Intake Total 600 2617 


 


Output Total 700 1300 


 


Balance -100 1317 














- Physical Exam


Constitutional: no apparent distress, unkempt


Eyes: PERRL


Ears, Nose, Mouth, Throat: moist mucous membranes


Cardiovascular: regular rate and rhythym


Respiratory: no respiratory distress


Gastrointestinal: soft, non-tender abdomen


Genitourinary: fuentes in urethra


Skin: warm


Musculoskeletal: generalized weakness


Neurologic: other (unable to assess. )


Psychiatric: encephalopathic


Lymph, Heme, Immunologic: no cervical LAD





ICD10 Worksheet


Patient Problems: 


 Problems











Problem Status Onset


 


Urinary tract infection Acute  


 


Dehydration Acute  


 


Dementia Acute  


 


Encephalopathy acute Acute  


 


Gait instability Acute  


 


Hyperammonemia Acute  


 


Sinusitis Acute

## 2019-04-17 NOTE — ASMTCMCOM
CM Note

 

CM Note                       

Notes:

Patient's son was not able to come to the palliative care meeting today. It has been rescheduled 

for Friday 4-19-19 at 3:00 PM.  Spoke with [facility] today and the patient is at his baseline and 

is not eligible for SNF rehab. Patient does not have Medicaid which will make long term placement 

in a nursing home more difficult due to financial copays for the family. Patient's d/c plan is TBD 

currently since SNF rehab is no longer an option.CM will follow.

 

Date Signed:  04/17/2019 03:50 PM

Electronically Signed By:Ina Coleman LCSW

## 2019-04-18 RX ADMIN — TRIAMCINOLONE ACETONIDE SCH APP: 1 OINTMENT TOPICAL at 22:53

## 2019-04-18 RX ADMIN — DEXTROSE MONOHYDRATE AND SODIUM CHLORIDE SCH MLS: 5; .9 INJECTION, SOLUTION INTRAVENOUS at 14:34

## 2019-04-18 RX ADMIN — LACTULOSE SCH: 20 SOLUTION ORAL at 05:05

## 2019-04-18 RX ADMIN — LACTULOSE SCH GM: 20 SOLUTION ORAL at 10:43

## 2019-04-18 RX ADMIN — TRIAMCINOLONE ACETONIDE SCH APP: 1 OINTMENT TOPICAL at 15:49

## 2019-04-18 RX ADMIN — ENOXAPARIN SODIUM SCH MG: 100 INJECTION SUBCUTANEOUS at 09:05

## 2019-04-18 RX ADMIN — TRIAMCINOLONE ACETONIDE SCH APP: 1 OINTMENT TOPICAL at 09:07

## 2019-04-18 RX ADMIN — DEXTROSE MONOHYDRATE AND SODIUM CHLORIDE SCH MLS: 5; .9 INJECTION, SOLUTION INTRAVENOUS at 01:25

## 2019-04-18 RX ADMIN — TRIAMCINOLONE ACETONIDE SCH APP: 1 OINTMENT TOPICAL at 16:52

## 2019-04-18 RX ADMIN — LACTULOSE SCH GM: 20 SOLUTION ORAL at 15:48

## 2019-04-18 NOTE — HOSPPROG
Hospitalist Progress Note


Assessment/Plan: 





74yo M with dementia with several recent admissions for encephalopathy in 

setting of hyperammonemia and UTI presents with encephalopathy. 





#Acute toxic metabolic encephalopathy: 2/2 ammonia elevation with underlying 

dementia. Initially very somnolent with ammonia of 120. started on lactulose 

enemas and has woken up some. Not able to tolerate lactulose enemas at this 

point and is awake enough to hopefully take PO. 


-lactulose until BM


-repeat Ammonia 160s today


-hold sedating medications. 





#Rash: Likely contact dermatitis. Improving with triamcinolone ointment and 

benadryl cream prn. HSV/VZV testing negatvie





# bacturia: low colony counts of several bacteria. s/p several days of ctx 

which was discontinued given lack of evidence of UTI





#Hyperammonemia: No cirrhosis. Unclear etiology but recurrent issue. Ammonia 

has been trending down


-lactulose TID 


-repeat ammonia tomorrow. 





#Tremor: Previously seen by neurology, not c/w parkinson's. 





#Dementia: Chronic. He was previously living at the [facility] long term care 

facility but could not afford any longer so now living at home with son. PT and 

OT are recommending SNF/long term care, reportedly son amenable to that but 

have been unable to reach them 





#Goals of care: MOST form from 1/2019 reports comfort measures only but 

apparently son was unsure of that, is DNR. Palliative care consult placed for 

outpatient services. There is some mention that APS was called as patient not 

getting appropriate care at home. 


-Son has repeatedly not shown up for palliative care meetings. 


-meeting scheduled for tomorrow at 1500. 





VTE ppx: LMWH


Diet: regular


Code: DNR


Dispo:inpatient--patient not able to go to snf, may need to go to long term 

memory unit but sounds like finances are an issue. Dispo pending. 





Subjective: appears comfortable.


Objective: 


 Vital Signs











Temp Pulse Resp BP Pulse Ox


 


 37.2 C   60   16   115/76   96 


 


 04/18/19 08:00  04/18/19 08:00  04/18/19 08:00  04/18/19 08:00  04/18/19 08:00








 Laboratory Results





 04/17/19 04:20 





 04/17/19 04:20 





 











 04/17/19 04/18/19 04/19/19





 05:59 05:59 05:59


 


Intake Total 2617 1328 


 


Output Total 1300 1300 


 


Balance 1317 28 














- Physical Exam


Constitutional: no apparent distress


Eyes: PERRL


Ears, Nose, Mouth, Throat: moist mucous membranes


Cardiovascular: regular rate and rhythym, no murmur, rub, or gallop


Respiratory: no respiratory distress, no rales or rhonchi, clear to auscultation


Gastrointestinal: normoactive bowel sounds, soft, non-tender abdomen


Genitourinary: fuentes in urethra


Skin: warm, normal color


Musculoskeletal: generalized weakness


Neurologic: other (responds to name, waxing and waning mentation. )


Psychiatric: encephalopathic


Lymph, Heme, Immunologic: no cervical LAD





ICD10 Worksheet


Patient Problems: 


 Problems











Problem Status Onset


 


Urinary tract infection Acute  


 


Dehydration Acute  


 


Dementia Acute  


 


Encephalopathy acute Acute  


 


Gait instability Acute  


 


Hyperammonemia Acute  


 


Sinusitis Acute

## 2019-04-18 NOTE — ASMTCMCOM
CM Note

 

CM Note                       

Notes:

Pt discussed in rounds and with MD.  This CM called Jorje to make sure he will be at the meeting 

Friday 4/19 at 1500 and he said yes. Referals sent to [place], [place], [facility] in 

[facility]. CM will continue to monitor 

needs.



PLAN: TBD

 

Date Signed:  04/18/2019 04:42 PM

Electronically Signed By:Ling Archuleta

## 2019-04-19 RX ADMIN — TRIAMCINOLONE ACETONIDE SCH APP: 1 OINTMENT TOPICAL at 21:13

## 2019-04-19 RX ADMIN — LACTULOSE SCH GM: 20 SOLUTION ORAL at 13:06

## 2019-04-19 RX ADMIN — ENOXAPARIN SODIUM SCH MG: 100 INJECTION SUBCUTANEOUS at 09:33

## 2019-04-19 RX ADMIN — RIFAXIMIN SCH MG: 550 TABLET ORAL at 14:40

## 2019-04-19 RX ADMIN — LACTULOSE SCH GM: 20 SOLUTION ORAL at 21:13

## 2019-04-19 RX ADMIN — DEXTROSE MONOHYDRATE AND SODIUM CHLORIDE SCH MLS: 5; .9 INJECTION, SOLUTION INTRAVENOUS at 09:35

## 2019-04-19 RX ADMIN — TRIAMCINOLONE ACETONIDE SCH APP: 1 OINTMENT TOPICAL at 17:52

## 2019-04-19 RX ADMIN — TRIAMCINOLONE ACETONIDE SCH APP: 1 OINTMENT TOPICAL at 09:26

## 2019-04-19 RX ADMIN — LACTULOSE SCH GM: 20 SOLUTION ORAL at 04:55

## 2019-04-19 RX ADMIN — LACTULOSE PRN GM: 20 SOLUTION ORAL at 15:42

## 2019-04-19 RX ADMIN — DIPHENHYDRAMINE HYDROCHLORIDE, ZINC ACETATE PRN APP: 2; .1 CREAM TOPICAL at 03:55

## 2019-04-19 RX ADMIN — DEXTROSE MONOHYDRATE AND SODIUM CHLORIDE SCH MLS: 5; .9 INJECTION, SOLUTION INTRAVENOUS at 22:44

## 2019-04-19 RX ADMIN — RIFAXIMIN SCH MG: 550 TABLET ORAL at 21:13

## 2019-04-19 RX ADMIN — LACTULOSE SCH GM: 20 SOLUTION ORAL at 06:48

## 2019-04-19 RX ADMIN — LACTULOSE PRN GM: 20 SOLUTION ORAL at 15:00

## 2019-04-19 NOTE — ASMTCMCOM
CM Note

 

CM Note                       

Notes:

Met with Son JorjeMD Miller, MANN Harp  and CM to educate and discuss options for Pt's care and 

living situation.  CM discussed options for placement, [place] or [place], both have 

accepted but it would be a copay of $170.50 daily. CM has contacted Libia Lozano and she will 

see him on Monday to start the long term disability process. Jorje has decided to no insert tubes 

and is receptive to Palliative care. CM to follow needs.

PLAN: Likely  [place] or [place]

 

Date Signed:  04/19/2019 04:59 PM

Electronically Signed By:Ling Archuleta

## 2019-04-19 NOTE — HOSPPROG
Hospitalist Progress Note


Assessment/Plan: 





74yo M with dementia with several recent admissions for encephalopathy in 

setting of hyperammonemia and UTI presents with encephalopathy. 





#Acute toxic metabolic encephalopathy: 2/2 ammonia elevation with underlying 

dementia. struggling to get patient to take lactulose. Tried lactulose enemas 

which worked but then patient not cooperating and so unable to give. 


-lactulose 30 grams Q1h until BM


-start rifaximin 550 BID


-discussion today with son about placing NG tube, or if he wants comfort care. 


-hold sedating medications. 





#Rash: Likely contact dermatitis. Improving with triamcinolone ointment and 

benadryl cream prn. HSV/VZV testing negatvie





# bacturia: low colony counts of several bacteria. s/p several days of ctx 

which was discontinued given lack of evidence of UTI





#Hyperammonemia: No cirrhosis. Unclear etiology but recurrent issue. Ammonia 

has been trending down


-lactulose


-rifaximin


-repeat ammonia





#Tremor: Previously seen by neurology, not c/w parkinson's. 





#Dementia: Chronic. He was previously living at the [facility] long term care 

facility but could not afford any longer so now living at home with son. PT and 

OT are recommending SNF/long term care, reportedly son amenable to that but 

have been unable to reach them 





#Goals of care: MOST form from 1/2019 reports comfort measures only but 

apparently son was unsure of that, is DNR. Palliative care consult placed for 

outpatient services. There is some mention that APS was called as patient not 

getting appropriate care at home. 


-Son has repeatedly not shown up for palliative care meetings. 


-meeting scheduled for tomorrow at 1500. 





VTE ppx: LMWH


Diet: regular


Code: DNR


Dispo:inpatient--pending








spent over 40 minutes discussing paient with his son today regarding goals of 

care and advanced planning. 





Objective: 


 Vital Signs











Temp Pulse Resp BP Pulse Ox


 


 36.8 C   64   16   101/59 L  96 


 


 04/18/19 23:42  04/18/19 23:42  04/18/19 23:42  04/18/19 23:42  04/18/19 23:42








 Laboratory Results





 04/17/19 04:20 





 04/17/19 04:20 





 











 04/18/19 04/19/19 04/20/19





 05:59 05:59 05:59


 


Intake Total 1328 406 


 


Output Total 1300 200 


 


Balance 28 206 














ICD10 Worksheet


Patient Problems: 


 Problems











Problem Status Onset


 


Urinary tract infection Acute  


 


Dehydration Acute  


 


Dementia Acute  


 


Encephalopathy acute Acute  


 


Gait instability Acute  


 


Hyperammonemia Acute  


 


Sinusitis Acute

## 2019-04-20 RX ADMIN — LACTULOSE SCH: 20 SOLUTION ORAL at 23:14

## 2019-04-20 RX ADMIN — LACTULOSE PRN GM: 20 SOLUTION ORAL at 11:39

## 2019-04-20 RX ADMIN — ENOXAPARIN SODIUM SCH: 100 INJECTION SUBCUTANEOUS at 09:55

## 2019-04-20 RX ADMIN — DEXTROSE MONOHYDRATE AND SODIUM CHLORIDE SCH MLS: 5; .9 INJECTION, SOLUTION INTRAVENOUS at 13:06

## 2019-04-20 RX ADMIN — LACTULOSE SCH: 20 SOLUTION ORAL at 13:15

## 2019-04-20 RX ADMIN — TRIAMCINOLONE ACETONIDE SCH: 1 OINTMENT TOPICAL at 13:15

## 2019-04-20 RX ADMIN — RIFAXIMIN SCH: 550 TABLET ORAL at 23:14

## 2019-04-20 RX ADMIN — TRIAMCINOLONE ACETONIDE SCH: 1 OINTMENT TOPICAL at 23:14

## 2019-04-20 RX ADMIN — RIFAXIMIN SCH: 550 TABLET ORAL at 09:55

## 2019-04-20 RX ADMIN — LACTULOSE SCH: 20 SOLUTION ORAL at 04:51

## 2019-04-20 RX ADMIN — TRIAMCINOLONE ACETONIDE SCH APP: 1 OINTMENT TOPICAL at 17:12

## 2019-04-20 NOTE — HOSPPROG
Hospitalist Progress Note


Assessment/Plan: 





72yo M with dementia with several recent admissions for encephalopathy in 

setting of hyperammonemia and UTI presents with encephalopathy. 





#Acute toxic metabolic encephalopathy: 2/2 ammonia elevation with underlying 

dementia. struggling to get patient to take lactulose. Tried lactulose enemas 

which worked but then patient not cooperating and so unable to give. 


-give lactulose as able. 


-start rifaximin 550 BID


-discussion with son about placing NG tube,he wants comfort care. 


-hold sedating medications. 





#Rash: Likely contact dermatitis. Improving with triamcinolone ointment and 

benadryl cream prn. HSV/VZV testing negatvie





# bacturia: low colony counts of several bacteria. s/p several days of ctx 

which was discontinued given lack of evidence of UTI





#Hyperammonemia: No cirrhosis. Unclear etiology but recurrent issue. Ammonia 

has been trending down


-lactulose


-rifaximin


-repeat ammonia in am. seems to improved some. 





#Tremor: Previously seen by neurology, not c/w parkinson's. 





#Dementia: Chronic. He was previously living at the [facility] long term care 

facility but could not afford any longer so now living at home with son. PT and 

OT are recommending SNF/long term care, reportedly son amenable to that but 

have been unable to reach them 





#Goals of care: meeting with son who is MDPOA. comfort is goal. No tubes. no 

forcefeeding medications. [facility] 





VTE ppx: LMWH


Diet: regular


Code: DNR


Dispo:inpatient--pending








long discussion with son who is MDPOA and says that given lactulose and food as 

able. does not want tube, Goal is comfort. Plan is transfer to SNF with 

hospice. 





Subjective: patient awake this morning, mumbling. appears comfortable.


Objective: 


 Vital Signs











Temp Pulse Resp BP Pulse Ox


 


 36.4 C   64   17   129/80 H  94 


 


 04/20/19 07:14  04/20/19 07:14  04/20/19 07:14  04/20/19 07:14  04/20/19 07:14








 Laboratory Results





 04/17/19 04:20 





 04/17/19 04:20 





 











 04/19/19 04/20/19 04/21/19





 05:59 05:59 05:59


 


Intake Total 406 910 


 


Output Total 200 200 125


 


Balance 206 710 -125














- Physical Exam


Constitutional: chronically ill appearing


Eyes: PERRL, anicteric sclera, EOMI


Ears, Nose, Mouth, Throat: moist mucous membranes, hearing normal, ears appear 

normal, no oral mucosal ulcers


Cardiovascular: regular rate and rhythym, no murmur, rub, or gallop


Respiratory: no respiratory distress, no rales or rhonchi, clear to auscultation


Gastrointestinal: normoactive bowel sounds, soft, non-tender abdomen, no 

palpable masses


Genitourinary: fuentes in urethra


Skin: no rashes or abrasions, no fluctuance, no induration


Musculoskeletal: generalized weakness


Neurologic: other (mumbles incoherently. not oriented. )


Psychiatric: encephalopathic


Lymph, Heme, Immunologic: no cervical LAD, no supraclavicular LAD





ICD10 Worksheet


Patient Problems: 


 Problems











Problem Status Onset


 


Urinary tract infection Acute  


 


Dehydration Acute  


 


Dementia Acute  


 


Encephalopathy acute Acute  


 


Gait instability Acute  


 


Hyperammonemia Acute  


 


Sinusitis Acute

## 2019-04-21 LAB — PLATELET # BLD: 157 10^3/UL (ref 150–400)

## 2019-04-21 RX ADMIN — ACETAMINOPHEN PRN MG: 325 TABLET ORAL at 22:04

## 2019-04-21 RX ADMIN — LACTULOSE SCH: 20 SOLUTION ORAL at 20:15

## 2019-04-21 RX ADMIN — ENOXAPARIN SODIUM SCH: 100 INJECTION SUBCUTANEOUS at 11:25

## 2019-04-21 RX ADMIN — RIFAXIMIN SCH MG: 550 TABLET ORAL at 22:02

## 2019-04-21 RX ADMIN — LACTULOSE SCH GM: 20 SOLUTION ORAL at 14:56

## 2019-04-21 RX ADMIN — RIFAXIMIN SCH MG: 550 TABLET ORAL at 13:02

## 2019-04-21 RX ADMIN — DEXTROSE MONOHYDRATE AND SODIUM CHLORIDE SCH MLS: 5; .9 INJECTION, SOLUTION INTRAVENOUS at 18:19

## 2019-04-21 RX ADMIN — TRIAMCINOLONE ACETONIDE SCH: 1 OINTMENT TOPICAL at 18:24

## 2019-04-21 RX ADMIN — RIFAXIMIN SCH: 550 TABLET ORAL at 20:15

## 2019-04-21 RX ADMIN — TRIAMCINOLONE ACETONIDE SCH APP: 1 OINTMENT TOPICAL at 22:10

## 2019-04-21 RX ADMIN — LACTULOSE SCH GM: 20 SOLUTION ORAL at 04:58

## 2019-04-21 RX ADMIN — TRIAMCINOLONE ACETONIDE SCH APP: 1 OINTMENT TOPICAL at 11:27

## 2019-04-21 RX ADMIN — LACTULOSE SCH GM: 20 SOLUTION ORAL at 22:02

## 2019-04-21 NOTE — HOSPPROG
Hospitalist Progress Note


Assessment/Plan: 





74yo M with dementia with several recent admissions for encephalopathy in 

setting of hyperammonemia and UTI presents with encephalopathy. 





#Acute toxic metabolic encephalopathy: 2/2 ammonia elevation with underlying 

dementia. struggling to get patient to take lactulose. Tried lactulose enemas 

which worked but then patient not cooperating and so unable to give. Long 

discussion with son who is MDPOA who says he does not wan to force medications 

or treatments on his father. Current plan is to give medications as able but do 

not force patient to take them or place any NGT or PEG. 


-give lactulose as able. 


-start rifaximin 550 BID


-discussion with son about placing NG tube,he wants comfort care. 


-hold sedating medications. 





#Rash: Likely contact dermatitis. Improving with triamcinolone ointment and 

benadryl cream prn. HSV/VZV testing negatvie





# bacturia: low colony counts of several bacteria. s/p several days of ctx 

which was discontinued given lack of evidence of UTI





#Hyperammonemia: No cirrhosis. Unclear etiology but recurrent issue. Ammonia 

has been trending down


-lactulose


-rifaximin


-repeat ammonia in am. seems to improved some. 





#Tremor: Previously seen by neurology, not c/w parkinson's. 





#Dementia: Chronic. He was previously living at the [facility] long term care 

facility but could not afford any longer so now living at home with son. PT and 

OT are recommending SNF/long term care, reportedly son amenable to that but 

have been unable to reach them 





#Goals of care: meeting with son who is MDPOA. comfort is goal. No tubes. no 

force feeding medications. Goal to transfer to [facility] or [place]. 

Apparently finances are an issue as there is a copay that the son cannot pay.





VTE ppx: LMWH


Diet: regular


Code: DNR


Dispo:inpatient--pending








Subjective: patient with eyes closed. mumbles in English but otherwise appears 

comfortable.


Objective: 


 Vital Signs











Temp Pulse Resp BP Pulse Ox


 


 36.8 C   57 L  16   112/76   96 


 


 04/21/19 07:51  04/21/19 07:51  04/21/19 07:51  04/21/19 07:51  04/21/19 07:51








 Laboratory Results





 04/21/19 06:25 





 04/21/19 06:25 





 











 04/20/19 04/21/19 04/22/19





 05:59 05:59 05:59


 


Intake Total 910 1170 


 


Output Total 200 575 


 


Balance 710 595 














- Physical Exam


Constitutional: unkempt


Eyes: PERRL, anicteric sclera, EOMI


Ears, Nose, Mouth, Throat: moist mucous membranes, hearing normal, ears appear 

normal, no oral mucosal ulcers


Cardiovascular: regular rate and rhythym, no murmur, rub, or gallop


Respiratory: no respiratory distress, no rales or rhonchi, clear to auscultation


Gastrointestinal: normoactive bowel sounds, soft, non-tender abdomen, no 

palpable masses


Genitourinary: no bladder fullness, no bladder tenderness, no renal bruits, 

fuentes in urethra


Skin: warm, normal color, no fluctuance


Musculoskeletal: generalized weakness


Neurologic: other (moves all extremities. no focal deficits on observation, not 

able to actively assess. )


Psychiatric: encephalopathic


Lymph, Heme, Immunologic: no cervical LAD, no supraclavicular LAD





ICD10 Worksheet


Patient Problems: 


 Problems











Problem Status Onset


 


Urinary tract infection Acute  


 


Dehydration Acute  


 


Dementia Acute  


 


Encephalopathy acute Acute  


 


Gait instability Acute  


 


Hyperammonemia Acute  


 


Sinusitis Acute

## 2019-04-22 RX ADMIN — LACTULOSE SCH: 20 SOLUTION ORAL at 13:11

## 2019-04-22 RX ADMIN — TRIAMCINOLONE ACETONIDE SCH APP: 1 OINTMENT TOPICAL at 10:52

## 2019-04-22 RX ADMIN — ENOXAPARIN SODIUM SCH MG: 100 INJECTION SUBCUTANEOUS at 10:51

## 2019-04-22 RX ADMIN — DIPHENHYDRAMINE HYDROCHLORIDE, ZINC ACETATE PRN APP: 2; .1 CREAM TOPICAL at 10:51

## 2019-04-22 RX ADMIN — LACTULOSE SCH: 20 SOLUTION ORAL at 19:59

## 2019-04-22 RX ADMIN — DEXTROSE MONOHYDRATE AND SODIUM CHLORIDE SCH MLS: 5; .9 INJECTION, SOLUTION INTRAVENOUS at 05:47

## 2019-04-22 RX ADMIN — RIFAXIMIN SCH: 550 TABLET ORAL at 20:00

## 2019-04-22 RX ADMIN — LACTULOSE SCH: 20 SOLUTION ORAL at 05:48

## 2019-04-22 RX ADMIN — TRIAMCINOLONE ACETONIDE SCH APP: 1 OINTMENT TOPICAL at 17:12

## 2019-04-22 RX ADMIN — TRIAMCINOLONE ACETONIDE SCH APP: 1 OINTMENT TOPICAL at 20:53

## 2019-04-22 RX ADMIN — RIFAXIMIN SCH: 550 TABLET ORAL at 10:50

## 2019-04-22 NOTE — ASMTCMCOM
CM Note

 

CM Note                       

Notes:

Patient plan of care reviewed in am rounds. Per nursing he has suffered further decline and is not 

taking anything orally and is quiet somulent,  Palliative care to reach out to [facility] to see if 

patient might meet GIP criteria. Per the son the patient has a most form and wishes to be a 

DNR. ULTC 100 sent but anticipate patient may meet hospice criteria. CM to follow.



Plan: TBD

 

Date Signed:  04/22/2019 11:33 AM

Electronically Signed By:Nargis Dillon RN

## 2019-04-22 NOTE — HOSPPROG
Hospitalist Progress Note


Assessment/Plan: 





# goals of care - MOST form reports DNR;  son Jorje who is MDPOA would like 

hospice - they are evaluating for GIP today;  will not force meds or give enemas


# metabolic encephalopathy, acute on chronic


   - possibly d/t elevated ammonia levels


   - previously lived at [facility], but could not afford so was living with his son


# hyperammonemia - quite marked


   - will hold further workup given goals of care


   - cont lactulose and rifaximin if he will allow


# bacteruria - multiple colonies, will not target


# tremor - not c/w parkinsons





Subjective: sitting in bed, not answering question - staring at TV and wall


Objective: 


 Vital Signs











Temp Pulse Resp BP Pulse Ox


 


 36.7 C   63   16   135/78 H  93 


 


 04/22/19 15:13  04/22/19 15:13  04/22/19 15:13  04/22/19 15:13  04/22/19 15:13








 Laboratory Results





 04/21/19 06:25 





 04/21/19 06:25 





 











 04/21/19 04/22/19 04/23/19





 05:59 05:59 05:59


 


Intake Total 1170 1148 


 


Output Total 575 2801 


 


Balance 595 -1653 








chart reviewed


ECG personally reviewed





- Physical Exam


Constitutional: chronically ill appearing


Cardiovascular: regular rate and rhythym, no murmur, rub, or gallop


Respiratory: no respiratory distress, no rales or rhonchi, clear to auscultation


Gastrointestinal: soft, non-tender abdomen, no palpable masses





ICD10 Worksheet


Patient Problems: 


 Problems











Problem Status Onset


 


Dehydration Acute  


 


Urinary tract infection Acute  


 


Sinusitis Acute  


 


Dementia Acute  


 


Gait instability Acute  


 


Encephalopathy acute Acute  


 


Hyperammonemia Acute

## 2019-04-23 RX ADMIN — TRIAMCINOLONE ACETONIDE SCH APP: 1 OINTMENT TOPICAL at 22:39

## 2019-04-23 RX ADMIN — LACTULOSE SCH: 20 SOLUTION ORAL at 22:37

## 2019-04-23 RX ADMIN — DEXTROSE MONOHYDRATE AND SODIUM CHLORIDE SCH MLS: 5; .9 INJECTION, SOLUTION INTRAVENOUS at 08:23

## 2019-04-23 RX ADMIN — TRIAMCINOLONE ACETONIDE SCH APP: 1 OINTMENT TOPICAL at 16:37

## 2019-04-23 RX ADMIN — DEXTROSE MONOHYDRATE AND SODIUM CHLORIDE SCH MLS: 5; .9 INJECTION, SOLUTION INTRAVENOUS at 22:07

## 2019-04-23 RX ADMIN — LACTULOSE PRN GM: 20 SOLUTION ORAL at 14:27

## 2019-04-23 RX ADMIN — RIFAXIMIN SCH MG: 550 TABLET ORAL at 08:23

## 2019-04-23 RX ADMIN — LACTULOSE SCH GM: 20 SOLUTION ORAL at 13:59

## 2019-04-23 RX ADMIN — ENOXAPARIN SODIUM SCH MG: 100 INJECTION SUBCUTANEOUS at 08:23

## 2019-04-23 RX ADMIN — RIFAXIMIN SCH: 550 TABLET ORAL at 22:37

## 2019-04-23 RX ADMIN — TRIAMCINOLONE ACETONIDE SCH APP: 1 OINTMENT TOPICAL at 12:08

## 2019-04-23 RX ADMIN — LACTULOSE SCH: 20 SOLUTION ORAL at 04:19

## 2019-04-23 NOTE — ASMTCMCOM
CM Note

 

CM Note                       

Notes:

Patient plan of care reviewed in am rounds. He is a DNR and has a most form. Patient is up in room 

today. Mostly refusing medications for treament of high ammonia level. Son was supposed to meet 

with MD today and called at 3:30 to say he was unwell and wont be here. He will attempt to visit 

tomorrow and possible meet with MD and case management. Possibility of getting patient a GIP bed 

with hospice at [facility] pending long term medicaid, Terry form BM aware and is agreeable. CM 

to follow.

Plan: SNF VS hospice

 

Date Signed:  04/23/2019 03:59 PM

Electronically Signed By:Nargis Dillon RN

## 2019-04-23 NOTE — HOSPPROG
Hospitalist Progress Note


Assessment/Plan: 





# goals of care - MOST form reports DNR;  son Jorje who is MDPOA would like 

hospice - they are evaluating for GIP today and may be accepted at [facility]

;  will not force meds or give enemas (he did accept lactulose today however)


   - unable to meet with jorje today, called and also left him a voice mail


# metabolic encephalopathy, acute on chronic


   - possibly d/t elevated ammonia levels


   - previously lived at [facility], but could not afford so was living with his son


# hyperammonemia - quite marked


   - will hold further workup given goals of care, but I'm not clear exactly on 

the etiology


   - cont lactulose and rifaximin if he will allow


# bacteruria - multiple colonies, low counts - will not target


# tremor - not c/w parkinsons





Subjective: son jorje called RN and requested a meeting with me at , but he 

has not arrived yet.


Objective: 


 Vital Signs











Temp Pulse Resp BP Pulse Ox


 


 36.7 C   72   18   115/73   92 


 


 04/23/19 12:09  04/23/19 12:09  04/23/19 12:09  04/23/19 12:09  04/23/19 12:09








 Laboratory Results





 04/21/19 06:25 





 04/21/19 06:25 





 











 04/22/19 04/23/19 04/24/19





 05:59 05:59 05:59


 


Intake Total 1148 1100 


 


Output Total 5099 2015 


 


Balance -2705 -444 














- Physical Exam


Constitutional: other (sitting in chair)


Cardiovascular: regular rate and rhythym, no murmur, rub, or gallop


Respiratory: no respiratory distress, no rales or rhonchi, clear to auscultation


Gastrointestinal: soft, non-tender abdomen, no palpable masses, No guarding, No 

rebound





ICD10 Worksheet


Patient Problems: 


 Problems











Problem Status Onset


 


Dehydration Acute  


 


Urinary tract infection Acute  


 


Sinusitis Acute  


 


Dementia Acute  


 


Gait instability Acute  


 


Encephalopathy acute Acute  


 


Hyperammonemia Acute

## 2019-04-24 VITALS — DIASTOLIC BLOOD PRESSURE: 81 MMHG | SYSTOLIC BLOOD PRESSURE: 134 MMHG

## 2019-04-24 LAB
INR PPP: 1.36 (ref 0.83–1.16)
PROTHROMBIN TIME: 16.2 SEC (ref 12–15)

## 2019-04-24 RX ADMIN — ENOXAPARIN SODIUM SCH MG: 100 INJECTION SUBCUTANEOUS at 09:39

## 2019-04-24 RX ADMIN — LACTULOSE SCH GM: 20 SOLUTION ORAL at 11:56

## 2019-04-24 RX ADMIN — TRIAMCINOLONE ACETONIDE SCH APP: 1 OINTMENT TOPICAL at 09:41

## 2019-04-24 RX ADMIN — LACTULOSE SCH GM: 20 SOLUTION ORAL at 04:38

## 2019-04-24 RX ADMIN — RIFAXIMIN SCH MG: 550 TABLET ORAL at 09:40

## 2019-04-24 NOTE — HOSPPROG
Hospitalist Progress Note


Assessment/Plan: 








# goals of care - MOST form reports DNR;  son Jorje who is MDPOA would like 

hospice - they are evaluating for GIP today and may be accepted at [facility]

;  will not force meds or give enemas


# metabolic encephalopathy, acute on chronic


   - possibly d/t elevated ammonia levels


   - previously lived at [facility], but could not afford so was living with his son


# hyperammonemia - quite marked


   - will hold further workup given goals of care, but I'm not clear exactly on 

the etiology


   - cont lactulose and rifaximin if he will allow


# bacteruria - multiple colonies, low counts - will not target


# tremor - not c/w parkinsons





Awaiting decision on placement


Pt is new to my care. 


Subjective: confused. did take his meds earlier. follows minimal commands


Objective: 


 Vital Signs











Temp Pulse Resp BP Pulse Ox


 


 36.8 C   67   16   135/82 H  91 L


 


 04/24/19 08:00  04/24/19 08:00  04/24/19 08:00  04/24/19 08:00  04/24/19 08:00








 Laboratory Results





 04/21/19 06:25 





 04/21/19 06:25 





 











 04/23/19 04/24/19 04/25/19





 05:59 05:59 05:59


 


Intake Total 1100 2973 


 


Output Total 2075 1200 


 


Balance -975 1773 








 











PT  16.2 SEC (12.0-15.0)  H  04/24/19  10:31    


 


INR  1.36  (0.83-1.16)  H  04/24/19  10:31    














- Physical Exam


Constitutional: no apparent distress


Eyes: PERRL, EOMI


Ears, Nose, Mouth, Throat: moist mucous membranes


Respiratory: no respiratory distress, no rales or rhonchi, clear to auscultation


Gastrointestinal: normoactive bowel sounds


Skin: warm


Neurologic: No AAOx3


Psychiatric: encephalopathic


Lymph, Heme, Immunologic: No petechiae





ICD10 Worksheet


Patient Problems: 


 Problems











Problem Status Onset


 


Urinary tract infection Acute  


 


Dehydration Acute  


 


Dementia Acute  


 


Encephalopathy acute Acute  


 


Gait instability Acute  


 


Hyperammonemia Acute  


 


Sinusitis Acute

## 2019-04-24 NOTE — PDDCSUM
Discharge Summary


Discharge Summary: 





72 yo male admitted with AMS. Etiology unclear but likely due to high ammonia 

levels. It is likely that he had not been compliant on the Lactulose that was 

previously ordered. He refused the Lactulose and the Rifaximin  initially but 

started taking it after his son convinced him to. Initially there was some 

discussion about hospice. I spoke to the son who is his Medical power of 

 and he wishes not to pursue hospice at this time. His hope is that his 

father will continue to take the meds and ammonia will decrease. He is, however

, open to palliative care in the op setting. 





The pt will be d/c to  SNF. 








# metabolic encephalopathy, acute on chronic


   - possibly d/t elevated ammonia levels


   - previously lived at [facility], but could not afford so was living with his son


# hyperammonemia - quite marked


   - will hold further workup given goals of care, but I'm not clear exactly on 

the etiology


   - cont lactulose and rifaximin if he will allow


# bacteruria - No UTI


# tremor - not c/w parkinsons





Exam: see PN for today





Meds: see med rec





total time spent on d/c is 45 mins

## 2019-04-24 NOTE — ASMTCMCOM
CM Note

 

CM Note                       

Notes:

Contacted Jorje about transferring Pt to [facility] SNF or Hospice Care. Jorje conveyed to this CM 


and the MD that he is not ready for Hospice but realizes he can call on Hospice at a later 

date. Jorje wants SNF. Terry [facility] care  called and he will arrange transport via 

stretcher.  Martín CABALLERO contacted again to sent authorized papers to [facility] because Terry 

has not received it.. CM available if needed.



PLAN: to [facility] SNF today.

 

Date Signed:  04/24/2019 02:56 PM

Electronically Signed By:Ling Archuleta

## 2019-04-24 NOTE — PDIAF
- Diagnosis


Diagnosis: encephalopathy


Code Status: Do Not Resuscitate





- Medication Management


Discharge Medications: electronically signed and located in the Home Medication 

List.





- Orders


Isolation Type: None


Diet Recommendation: no restrictions on diet


Diet Texture: Regular Texture Diet


Additional Instructions: 


Activity: as tolerated





f/u: with pcp next week





- Follow Up Care


Current Providers and Referrals: 


Abbey Zhu MD [Primary Care Provider] - As per Instructions

## 2019-04-24 NOTE — ASMTCMCOM
CM Note

 

CM Note                       

Notes:

Pt's chart reviewed for discharge. Received a call from Martín CABALLERO inquiring about where to send 

paper work for SNF that Pt will transfer to when authorized I spoke with Terry at [facility] who 


said Pt was accepted. I have left a message for Jorje to call at his earliest convenience to 

determine if Jorje wants SNF or Hospice. This CM left a message for Martín ([email or web address]) that Pt would go 

to [facility] and to send info at Fax ([phone]) per Terry. Pt will likely discharge today 

if medically cleared and Son Jorje agrees. CM available for needs



 PLAN: [facility] w/ Hospice or SNF status

 

Date Signed:  04/24/2019 09:37 AM

Electronically Signed By:Ling Archuleta

## 2019-06-03 ENCOUNTER — HOSPITAL ENCOUNTER (INPATIENT)
Dept: HOSPITAL 80 - F3E | Age: 74
LOS: 10 days | Discharge: HOME HEALTH | End: 2019-06-13
Payer: COMMERCIAL

## 2019-10-11 NOTE — SOAPPROG
SOAP Progress Note


Assessment/Plan: 


Assessment/Plan:





Hypernatremia Na was 155 on presentation after being 138 two days prior, and 

then repeat labs have shown it consistently at 137. On one hand, it is possible 

the 155 was an error as it the only level out of range.  On the other hand, he 

may have had some significant dehydratio in setting of UTI and multiple 

urinations and the 155 was real, but this was a very acute hange for him as it 

happened in less than two days.  s such, the quick correction is less worrisome.


 - Na remains stable at 139 on low rate of LR.


 - Can stop IVFs if taking better PO








Thank you for the interesting consult  Nephrology will sign off at this time, 

please call lif you have any additional questions or concerns.




















Subjective: 





No acute events overnight.  Pt is awake and alert, sitting up in chair this 

morning.


Objective: 





 Vital Signs











Temp Pulse Resp BP Pulse Ox


 


 36.7 C   65   18   125/78 H  96 


 


 11/27/18 08:13  11/27/18 08:13  11/27/18 08:13  11/27/18 08:13  11/27/18 08:13








 Laboratory Results





 11/27/18 04:40 





 











 11/26/18 11/27/18 11/28/18





 05:59 05:59 05:59


 


Intake Total  900 


 


Output Total  1150 


 


Balance  -250 








General: alert, no acute distress


Eyes: EOMI, PERRL


OP: clear


Cv; RRR


Resp: nonlabored respirations, CTAB


Abd: Soft, NT/ND


Ext: no edema


Neuro: CN II-XII Grossly intact








ICD10 Worksheet


Patient Problems: 


 Problems











Problem Status Onset


 


Dehydration Acute  


 


Sinusitis Acute  


 


Urinary tract infection Acute 0 = independent